# Patient Record
Sex: FEMALE | Race: WHITE | NOT HISPANIC OR LATINO | Employment: UNEMPLOYED | URBAN - METROPOLITAN AREA
[De-identification: names, ages, dates, MRNs, and addresses within clinical notes are randomized per-mention and may not be internally consistent; named-entity substitution may affect disease eponyms.]

---

## 2022-06-08 ENCOUNTER — OFFICE VISIT (OUTPATIENT)
Dept: INTERNAL MEDICINE | Facility: CLINIC | Age: 42
End: 2022-06-08
Payer: MEDICAID

## 2022-06-08 VITALS
SYSTOLIC BLOOD PRESSURE: 112 MMHG | TEMPERATURE: 99 F | WEIGHT: 117.38 LBS | HEIGHT: 58 IN | DIASTOLIC BLOOD PRESSURE: 82 MMHG | HEART RATE: 85 BPM | RESPIRATION RATE: 20 BRPM | BODY MASS INDEX: 24.64 KG/M2

## 2022-06-08 DIAGNOSIS — Z12.31 ENCOUNTER FOR SCREENING MAMMOGRAM FOR MALIGNANT NEOPLASM OF BREAST: ICD-10-CM

## 2022-06-08 DIAGNOSIS — Z13.1 SCREENING FOR DIABETES MELLITUS: ICD-10-CM

## 2022-06-08 DIAGNOSIS — F41.9 ANXIETY AND DEPRESSION: ICD-10-CM

## 2022-06-08 DIAGNOSIS — Z11.3 ROUTINE SCREENING FOR STI (SEXUALLY TRANSMITTED INFECTION): ICD-10-CM

## 2022-06-08 DIAGNOSIS — Z00.00 WELLNESS EXAMINATION: Primary | ICD-10-CM

## 2022-06-08 DIAGNOSIS — F32.A ANXIETY AND DEPRESSION: ICD-10-CM

## 2022-06-08 DIAGNOSIS — G47.00 INSOMNIA, UNSPECIFIED TYPE: ICD-10-CM

## 2022-06-08 PROCEDURE — 3074F SYST BP LT 130 MM HG: CPT | Mod: CPTII,,, | Performed by: NURSE PRACTITIONER

## 2022-06-08 PROCEDURE — 3079F DIAST BP 80-89 MM HG: CPT | Mod: CPTII,,, | Performed by: NURSE PRACTITIONER

## 2022-06-08 PROCEDURE — 99203 OFFICE O/P NEW LOW 30 MIN: CPT | Mod: S$PBB,,, | Performed by: NURSE PRACTITIONER

## 2022-06-08 PROCEDURE — 1159F MED LIST DOCD IN RCRD: CPT | Mod: CPTII,,, | Performed by: NURSE PRACTITIONER

## 2022-06-08 PROCEDURE — 99203 PR OFFICE/OUTPT VISIT, NEW, LEVL III, 30-44 MIN: ICD-10-PCS | Mod: S$PBB,,, | Performed by: NURSE PRACTITIONER

## 2022-06-08 PROCEDURE — 99204 OFFICE O/P NEW MOD 45 MIN: CPT | Mod: PBBFAC | Performed by: NURSE PRACTITIONER

## 2022-06-08 PROCEDURE — 1159F PR MEDICATION LIST DOCUMENTED IN MEDICAL RECORD: ICD-10-PCS | Mod: CPTII,,, | Performed by: NURSE PRACTITIONER

## 2022-06-08 PROCEDURE — 3074F PR MOST RECENT SYSTOLIC BLOOD PRESSURE < 130 MM HG: ICD-10-PCS | Mod: CPTII,,, | Performed by: NURSE PRACTITIONER

## 2022-06-08 PROCEDURE — 1160F PR REVIEW ALL MEDS BY PRESCRIBER/CLIN PHARMACIST DOCUMENTED: ICD-10-PCS | Mod: CPTII,,, | Performed by: NURSE PRACTITIONER

## 2022-06-08 PROCEDURE — 3008F BODY MASS INDEX DOCD: CPT | Mod: CPTII,,, | Performed by: NURSE PRACTITIONER

## 2022-06-08 PROCEDURE — 1160F RVW MEDS BY RX/DR IN RCRD: CPT | Mod: CPTII,,, | Performed by: NURSE PRACTITIONER

## 2022-06-08 PROCEDURE — 3079F PR MOST RECENT DIASTOLIC BLOOD PRESSURE 80-89 MM HG: ICD-10-PCS | Mod: CPTII,,, | Performed by: NURSE PRACTITIONER

## 2022-06-08 PROCEDURE — 3008F PR BODY MASS INDEX (BMI) DOCUMENTED: ICD-10-PCS | Mod: CPTII,,, | Performed by: NURSE PRACTITIONER

## 2022-06-08 RX ORDER — FLUOXETINE HYDROCHLORIDE 20 MG/1
20 CAPSULE ORAL DAILY
COMMUNITY
Start: 2022-05-19 | End: 2023-08-17

## 2022-06-08 RX ORDER — TRAZODONE HYDROCHLORIDE 50 MG/1
50 TABLET ORAL NIGHTLY
COMMUNITY
Start: 2022-03-19

## 2022-06-08 RX ORDER — CLONAZEPAM 0.5 MG/1
0.5 TABLET ORAL 3 TIMES DAILY
COMMUNITY
Start: 2022-04-07 | End: 2023-08-17

## 2022-06-08 RX ORDER — FLUOXETINE HYDROCHLORIDE 40 MG/1
40 CAPSULE ORAL DAILY
COMMUNITY
Start: 2022-03-19

## 2022-06-08 NOTE — ASSESSMENT & PLAN NOTE
Obtain pap records from GYN provider  Labs ordered to be done prior to f/u  Mammo due at 42th birthday in September

## 2022-06-08 NOTE — PROGRESS NOTES
"  PEBBLES Chávez   [facility]  [address] [facility] [address]      PATIENT NAME: Raegan Paez  : 1980  DATE: 22  MRN: [id number]      Billing Provider: PEBBLES Chávez  Level of Service:   Patient PCP Information     Provider PCP Type    PEBBLES Chávez General          Reason for Visit / Chief Complaint: Establish Care       History of Present Illness / Problem Focused Workflow     Raegan Paez presents to the clinic with Establish Care     41 y.o.  presenting [facility].     Previous PCP: Jeffery Ma NP in [place]  PmHx: Anxiety and depression, intentional overdose of acetaminophen with subsequent coma/temporary tracheostomy (resolved)  SHx: see below  FHx: see below. Reports h/o cancer on both sides of the family but cannot ascertain any specific types  Complaints today: Establish care. Patient lives with friends who come to clinic here, so she wanted to switch services to here as well. She has a h/o anxiety, depression and insomnia. She's prescribed Klonopin TID per last PCP for anxiety and "sleep." She was in a coma about 10-11 years ago after her "ex- made me overdose on Norco and Tylenol." She was pregnant with twins at the time and miscarried. Also had a tracheostomy which was removed. [facility] was [place]. [facility] hasn't taken Tylenol since that time. She follows [facility] for women's needs. [facility] last appt a few months ago. Reportedly had a negative pap smear. No acute concerns.        Review of Systems     Review of Systems   Constitutional: Negative.    HENT: Negative.    Eyes: Negative.    Respiratory: Negative.    Cardiovascular: Negative.    Gastrointestinal: Negative.    Endocrine: Negative.    Genitourinary: Negative.    Musculoskeletal: Negative.    Skin: Negative.    Allergic/Immunologic: Negative.    Neurological: " Negative.    Hematological: Negative.    Psychiatric/Behavioral: Negative.        Medical / Social / Family History   History reviewed. No pertinent past medical history.    Past Surgical History:   Procedure Laterality Date    HYSTERECTOMY         Social History  Ms.  reports that she has never smoked. She has never used smokeless tobacco. She reports previous alcohol use. She reports that she does not use drugs.    Family History  Ms.'s family history includes Diabetes in her father.    Medications and Allergies     Medications  Medication List with Changes/Refills   Current Medications    CLONAZEPAM (KLONOPIN) 0.5 MG TABLET    Take 0.5 mg by mouth 3 (three) times daily.    FLUOXETINE 20 MG CAPSULE    Take 20 mg by mouth once daily.    FLUOXETINE 40 MG CAPSULE    Take 40 mg by mouth once daily.    TRAZODONE (DESYREL) 50 MG TABLET    Take 50 mg by mouth nightly.       Allergies  Review of patient's allergies indicates:   Allergen Reactions    Lortab [hydrocodone-acetaminophen]     Tylenol [acetaminophen]        Physical Examination     Vitals:    06/08/22 1353   BP: 112/82   Pulse: 85   Resp: 20   Temp: 98.8 °F (37.1 °C)     Physical Exam  Constitutional:       Appearance: Normal appearance.   HENT:      Head: Normocephalic and atraumatic.      Right Ear: Tympanic membrane, ear canal and external ear normal.      Left Ear: Tympanic membrane, ear canal and external ear normal.      Nose: Nose normal.      Mouth/Throat:      Mouth: Mucous membranes are moist.      Pharynx: Oropharynx is clear.   Eyes:      Extraocular Movements: Extraocular movements intact.      Conjunctiva/sclera: Conjunctivae normal.      Pupils: Pupils are equal, round, and reactive to light.   Neck:      Vascular: No carotid bruit.      Comments: Trach scar  Cardiovascular:      Rate and Rhythm: Normal rate and regular rhythm.      Pulses: Normal pulses.      Heart sounds: Normal heart sounds.   Pulmonary:      Effort: Pulmonary effort is  normal.      Breath sounds: Normal breath sounds.   Abdominal:      General: Bowel sounds are normal.      Palpations: Abdomen is soft.   Musculoskeletal:         General: Normal range of motion.      Cervical back: Normal range of motion and neck supple. No tenderness.   Skin:     General: Skin is warm and dry.   Neurological:      General: No focal deficit present.      Mental Status: She is alert and oriented to person, place, and time. Mental status is at baseline.   Psychiatric:         Mood and Affect: Mood normal.         Behavior: Behavior normal.         Thought Content: Thought content normal.         Judgment: Judgment normal.           Results   No results found for: WBC, RBC, HGB, HCT, MCV, MCH, MCHC, RDW, PLT, MPV, GRAN, LYMPH, MONO, EOS, BASO, EOSINOPHIL, BASOPHIL  CMP  No results found for: NA, K, CL, CO2, GLU, BUN, CREATININE, CALCIUM, PROT, ALBUMIN, BILITOT, ALKPHOS, AST, ALT, ANIONGAP, ESTGFRAFRICA, EGFRNONAA  No results found for: CHOL  No results found for: HDL  No results found for: LDLCALC  No results found for: TRIG  No results found for: CHOLHDL  No results found for: TSH  No results found for: PHUR, SPECGRAV, PROTEINUA, GLUCUA, KETONESU, OCCULTUA, NITRITE, LEUKOCYTESUR        Assessment and Plan (including Health Maintenance)     Plan:         Health Maintenance Due   Topic Date Due    Hepatitis C Screening  Never done    Lipid Panel  Never done    COVID-19 Vaccine (1) Never done    HIV Screening  Never done    TETANUS VACCINE  Never done       Problem List Items Addressed This Visit        Psychiatric    Anxiety and depression    Overview     Pt's PCP was prescribing Klonopin 0.5 mg po TID for anxiety. Pt informed that this controlled substance can't be continued for chronic use in this clinic. Advised pt that she'd need to be referred to a mental health provider. Pt stated that is a clinic in [place] that offers such services and she would like to be seen there d/t convenience. She  will contact the clinic and notify us if a referral is needed              Other    Wellness examination - Primary    Current Assessment & Plan     Obtain pap records from GYN provider  Labs ordered to be done prior to f/u  Mammo due at 42th birthday in September           Relevant Orders    CBC Auto Differential    Comprehensive Metabolic Panel    Lipid Panel    TSH    Urinalysis, Reflex to Urine Culture Urine, Clean Catch    Insomnia    Overview     Managed with Trazodone 50 mg po nightly             Other Visit Diagnoses     Routine screening for STI (sexually transmitted infection)        Relevant Orders    Hepatitis Panel, Acute    HIV 1/2 Ag/Ab (4th Gen)    Chlamydia/GC, PCR    SYPHILIS ANTIBODY (WITH REFLEX RPR)    Encounter for screening mammogram for malignant neoplasm of breast        Relevant Orders    Mammo Digital Screening Bilat    Screening for diabetes mellitus        Relevant Orders    Hemoglobin A1C          Health Maintenance Topics with due status: Not Due       Topic Last Completion Date    Influenza Vaccine Not Due       Future Appointments   Date Time Provider Department Center   7/7/2022 10:00 AM PEBBLES Chávez [facility]            Signature:  PEBBLES Chávez  [facility]  [address] [facility] [address]    Date of encounter: 6/8/22

## 2022-07-05 ENCOUNTER — PATIENT MESSAGE (OUTPATIENT)
Dept: INTERNAL MEDICINE | Facility: CLINIC | Age: 42
End: 2022-07-05
Payer: MEDICAID

## 2022-07-05 ENCOUNTER — LAB VISIT (OUTPATIENT)
Dept: LAB | Facility: HOSPITAL | Age: 42
End: 2022-07-05
Attending: NURSE PRACTITIONER
Payer: MEDICAID

## 2022-07-05 DIAGNOSIS — Z00.00 WELLNESS EXAMINATION: ICD-10-CM

## 2022-07-05 DIAGNOSIS — Z11.3 ROUTINE SCREENING FOR STI (SEXUALLY TRANSMITTED INFECTION): ICD-10-CM

## 2022-07-05 DIAGNOSIS — Z13.1 SCREENING FOR DIABETES MELLITUS: ICD-10-CM

## 2022-07-05 LAB
ALBUMIN SERPL-MCNC: 3.7 GM/DL (ref 3.5–5)
ALBUMIN/GLOB SERPL: 1 RATIO (ref 1.1–2)
ALP SERPL-CCNC: 99 UNIT/L (ref 40–150)
ALT SERPL-CCNC: 14 UNIT/L (ref 0–55)
APPEARANCE UR: CLEAR
AST SERPL-CCNC: 13 UNIT/L (ref 5–34)
BACTERIA #/AREA URNS AUTO: ABNORMAL /HPF
BASOPHILS # BLD AUTO: 0.04 X10(3)/MCL (ref 0–0.2)
BASOPHILS NFR BLD AUTO: 0.7 %
BILIRUB UR QL STRIP.AUTO: NEGATIVE MG/DL
BILIRUBIN DIRECT+TOT PNL SERPL-MCNC: 0.6 MG/DL
BUN SERPL-MCNC: 16.6 MG/DL (ref 7–18.7)
C TRACH DNA SPEC QL NAA+PROBE: NOT DETECTED
CALCIUM SERPL-MCNC: 8.9 MG/DL (ref 8.4–10.2)
CHLORIDE SERPL-SCNC: 107 MMOL/L (ref 98–107)
CHOLEST SERPL-MCNC: 189 MG/DL
CHOLEST/HDLC SERPL: 4 {RATIO} (ref 0–5)
CO2 SERPL-SCNC: 24 MMOL/L (ref 22–29)
COLOR UR AUTO: ABNORMAL
CREAT SERPL-MCNC: 0.74 MG/DL (ref 0.55–1.02)
EOSINOPHIL # BLD AUTO: 0.22 X10(3)/MCL (ref 0–0.9)
EOSINOPHIL NFR BLD AUTO: 3.8 %
ERYTHROCYTE [DISTWIDTH] IN BLOOD BY AUTOMATED COUNT: 13.3 % (ref 11.5–17)
EST. AVERAGE GLUCOSE BLD GHB EST-MCNC: 93.9 MG/DL
GLOBULIN SER-MCNC: 3.7 GM/DL (ref 2.4–3.5)
GLUCOSE SERPL-MCNC: 103 MG/DL (ref 74–100)
GLUCOSE UR QL STRIP.AUTO: NORMAL MG/DL
HAV IGM SERPL QL IA: NONREACTIVE
HBA1C MFR BLD: 4.9 %
HBV CORE IGM SERPL QL IA: NONREACTIVE
HBV SURFACE AG SERPL QL IA: REACTIVE
HBV SURFACE AG SERPLBLD QL IA.RAPID: NORMAL
HCT VFR BLD AUTO: 43 % (ref 37–47)
HCV AB SERPL QL IA: NONREACTIVE
HDLC SERPL-MCNC: 52 MG/DL (ref 35–60)
HGB BLD-MCNC: 13.6 GM/DL (ref 12–16)
HIV 1+2 AB+HIV1 P24 AG SERPL QL IA: NONREACTIVE
HYALINE CASTS #/AREA URNS LPF: ABNORMAL /LPF
IMM GRANULOCYTES # BLD AUTO: 0.02 X10(3)/MCL (ref 0–0.04)
IMM GRANULOCYTES NFR BLD AUTO: 0.3 %
KETONES UR QL STRIP.AUTO: NEGATIVE MG/DL
LDLC SERPL CALC-MCNC: 113 MG/DL (ref 50–140)
LEUKOCYTE ESTERASE UR QL STRIP.AUTO: 25 UNIT/L
LYMPHOCYTES # BLD AUTO: 0.94 X10(3)/MCL (ref 0.6–4.6)
LYMPHOCYTES NFR BLD AUTO: 16.3 %
MCH RBC QN AUTO: 30 PG (ref 27–31)
MCHC RBC AUTO-ENTMCNC: 31.6 MG/DL (ref 33–36)
MCV RBC AUTO: 94.9 FL (ref 80–94)
MONOCYTES # BLD AUTO: 0.41 X10(3)/MCL (ref 0.1–1.3)
MONOCYTES NFR BLD AUTO: 7.1 %
MUCOUS THREADS URNS QL MICRO: ABNORMAL /LPF
N GONORRHOEA DNA SPEC QL NAA+PROBE: NOT DETECTED
NEUTROPHILS # BLD AUTO: 4.1 X10(3)/MCL (ref 2.1–9.2)
NEUTROPHILS NFR BLD AUTO: 71.8 %
NITRITE UR QL STRIP.AUTO: NEGATIVE
NRBC BLD AUTO-RTO: 0 %
PH UR STRIP.AUTO: 6 [PH]
PLATELET # BLD AUTO: 222 X10(3)/MCL (ref 130–400)
PMV BLD AUTO: 10.5 FL (ref 7.4–10.4)
POTASSIUM SERPL-SCNC: 3.9 MMOL/L (ref 3.5–5.1)
PROT SERPL-MCNC: 7.4 GM/DL (ref 6.4–8.3)
PROT UR QL STRIP.AUTO: NEGATIVE MG/DL
RBC # BLD AUTO: 4.53 X10(6)/MCL (ref 4.2–5.4)
RBC #/AREA URNS AUTO: ABNORMAL /HPF
RBC UR QL AUTO: NEGATIVE UNIT/L
SODIUM SERPL-SCNC: 138 MMOL/L (ref 136–145)
SP GR UR STRIP.AUTO: 1.02
SQUAMOUS #/AREA URNS LPF: ABNORMAL /HPF
T PALLIDUM AB SER QL: NONREACTIVE
TRIGL SERPL-MCNC: 120 MG/DL (ref 37–140)
TSH SERPL-ACNC: 3.41 UIU/ML (ref 0.35–4.94)
UROBILINOGEN UR STRIP-ACNC: NORMAL MG/DL
VLDLC SERPL CALC-MCNC: 24 MG/DL
WBC # SPEC AUTO: 5.8 X10(3)/MCL (ref 4.5–11.5)
WBC #/AREA URNS AUTO: ABNORMAL /HPF

## 2022-07-05 PROCEDURE — 87389 HIV-1 AG W/HIV-1&-2 AB AG IA: CPT

## 2022-07-05 PROCEDURE — 87088 URINE BACTERIA CULTURE: CPT

## 2022-07-05 PROCEDURE — 81001 URINALYSIS AUTO W/SCOPE: CPT

## 2022-07-05 PROCEDURE — 80074 ACUTE HEPATITIS PANEL: CPT

## 2022-07-05 PROCEDURE — 36415 COLL VENOUS BLD VENIPUNCTURE: CPT

## 2022-07-05 PROCEDURE — 87341 HEP B SURFACE AG NEUTRLZJ IA: CPT

## 2022-07-05 PROCEDURE — 85025 COMPLETE CBC W/AUTO DIFF WBC: CPT

## 2022-07-05 PROCEDURE — 84443 ASSAY THYROID STIM HORMONE: CPT

## 2022-07-05 PROCEDURE — 86780 TREPONEMA PALLIDUM: CPT

## 2022-07-05 PROCEDURE — 80053 COMPREHEN METABOLIC PANEL: CPT

## 2022-07-05 PROCEDURE — 80061 LIPID PANEL: CPT

## 2022-07-05 PROCEDURE — 83036 HEMOGLOBIN GLYCOSYLATED A1C: CPT

## 2022-07-05 PROCEDURE — 87591 N.GONORRHOEAE DNA AMP PROB: CPT

## 2022-07-05 PROCEDURE — 87491 CHLMYD TRACH DNA AMP PROBE: CPT

## 2022-07-06 ENCOUNTER — PATIENT MESSAGE (OUTPATIENT)
Dept: INTERNAL MEDICINE | Facility: CLINIC | Age: 42
End: 2022-07-06
Payer: MEDICAID

## 2022-07-06 PROBLEM — B19.10 HEPATITIS B: Status: ACTIVE | Noted: 2022-07-06

## 2022-07-06 LAB — PATH REV: NORMAL

## 2022-07-06 NOTE — PROGRESS NOTES
"  PEBBLES Chávez   [facility]  [address] [facility] [address]      PATIENT NAME: Raegan Paez  : 1980  DATE: 22  MRN: [id number]      Billing Provider: PEBBLES Chávez  Level of Service:   Patient PCP Information     Provider PCP Type    PEBBLES Chávez General          Reason for Visit / Chief Complaint: Follow-up (Lab review)       History of Present Illness / Problem Focused Workflow     Raegan Paez presents to the clinic with Follow-up (Lab review)     2022: 41 y.o.  presenting to [facility] to establish primary care.     Previous PCP: Jeffery Ma NP in [place]  PmHx: Anxiety and depression, intentional overdose of acetaminophen with subsequent coma/temporary tracheostomy (resolved)  SHx: see below  FHx: see below. Reports h/o cancer on both sides of the family but cannot ascertain any specific types  Complaints today: Establish care. Patient lives with friends who come to clinic here, so she wanted to switch services to here as well. She has a h/o anxiety, depression and insomnia. She's prescribed Klonopin TID per last PCP for anxiety and "sleep." She was in a coma about 10-11 years ago after her "ex- made me overdose on Norco and Tylenol." She was pregnant with twins at the time and miscarried. Also had a tracheostomy which was removed.  [place]. [facility] hasn't taken Tylenol since that time. She follows [facility] for women's needs. [facility] last appt a few months ago. Reportedly had a negative pap smear. No acute concerns.    2022: Raegan is presenting to review labs. Lab review revealed: CBC unremarkable, glucose 103, A1c 4.9, FLP WNL, TSH WNL, UA with some WBCs and occ bacteria (urine culture no growth; patient denies urinary concerns), Hepatitis B surface antigen reactive. "The presence of hepatitis B surface antigen indicates acute or " "chronic hepatitis B infection." Today, patient reports that she's always been told "I was a carrier." States father is a carrier and sister has hepatitis B. She has no abdominal pain, fatigue, appetite changes, abd swelling, jaundice, dark urine, etc. No other concerns.    Note, patient following psychiatry outside of this facility.       Review of Systems     Review of Systems   Constitutional: Negative.    HENT: Negative.    Eyes: Negative.    Respiratory: Negative.    Cardiovascular: Negative.    Gastrointestinal: Negative.    Endocrine: Negative.    Genitourinary: Negative.    Musculoskeletal: Negative.    Skin: Negative.    Allergic/Immunologic: Negative.    Neurological: Negative.    Hematological: Negative.    Psychiatric/Behavioral: Negative.        Medical / Social / Family History   History reviewed. No pertinent past medical history.    Past Surgical History:   Procedure Laterality Date    HYSTERECTOMY         Social History  Ms.  reports that she has never smoked. She has never used smokeless tobacco. She reports previous alcohol use. She reports that she does not use drugs.    Family History  Ms.'s family history includes Diabetes in her father.    Medications and Allergies     Medications  Medication List with Changes/Refills   Current Medications    CLONAZEPAM (KLONOPIN) 0.5 MG TABLET    Take 0.5 mg by mouth 3 (three) times daily.    FLUOXETINE 20 MG CAPSULE    Take 20 mg by mouth once daily.    FLUOXETINE 40 MG CAPSULE    Take 40 mg by mouth once daily.    TRAZODONE (DESYREL) 50 MG TABLET    Take 50 mg by mouth nightly.       Allergies  Review of patient's allergies indicates:   Allergen Reactions    Lortab [hydrocodone-acetaminophen]      Unsure    Tylenol [acetaminophen]      Unsure       Physical Examination     Vitals:    07/07/22 1107   BP: 102/70   Pulse: 81   Resp: 20   Temp: 98.3 °F (36.8 °C)     Physical Exam  Constitutional:       Appearance: Normal appearance.   HENT:      Head: " Normocephalic and atraumatic.      Mouth/Throat:      Mouth: Mucous membranes are moist.   Eyes:      Extraocular Movements: Extraocular movements intact.   Neck:      Comments: Trach scar  Cardiovascular:      Rate and Rhythm: Normal rate and regular rhythm.   Pulmonary:      Effort: Pulmonary effort is normal.      Breath sounds: Normal breath sounds.   Abdominal:      Palpations: Abdomen is soft.   Musculoskeletal:         General: Normal range of motion.      Cervical back: Normal range of motion and neck supple.   Skin:     General: Skin is warm and dry.   Neurological:      General: No focal deficit present.      Mental Status: She is alert and oriented to person, place, and time. Mental status is at baseline.   Psychiatric:         Mood and Affect: Mood normal.         Behavior: Behavior normal.         Thought Content: Thought content normal.         Judgment: Judgment normal.           Results     Lab Results   Component Value Date    WBC 5.8 07/05/2022    RBC 4.53 07/05/2022    HGB 13.6 07/05/2022    HCT 43.0 07/05/2022    MCV 94.9 (H) 07/05/2022    MCH 30.0 07/05/2022    MCHC 31.6 (L) 07/05/2022    RDW 13.3 07/05/2022     07/05/2022    MPV 10.5 (H) 07/05/2022     CMP  Sodium Level   Date Value Ref Range Status   07/05/2022 138 136 - 145 mmol/L Final     Potassium Level   Date Value Ref Range Status   07/05/2022 3.9 3.5 - 5.1 mmol/L Final     Carbon Dioxide   Date Value Ref Range Status   07/05/2022 24 22 - 29 mmol/L Final     Blood Urea Nitrogen   Date Value Ref Range Status   07/05/2022 16.6 7.0 - 18.7 mg/dL Final     Creatinine   Date Value Ref Range Status   07/05/2022 0.74 0.55 - 1.02 mg/dL Final     Calcium Level Total   Date Value Ref Range Status   07/05/2022 8.9 8.4 - 10.2 mg/dL Final     Albumin Level   Date Value Ref Range Status   07/05/2022 3.7 3.5 - 5.0 gm/dL Final     Bilirubin Total   Date Value Ref Range Status   07/05/2022 0.6 <=1.5 mg/dL Final     Alkaline Phosphatase   Date Value  Ref Range Status   07/05/2022 99 40 - 150 unit/L Final     Aspartate Aminotransferase   Date Value Ref Range Status   07/05/2022 13 5 - 34 unit/L Final     Alanine Aminotransferase   Date Value Ref Range Status   07/05/2022 14 0 - 55 unit/L Final     Estimated GFR-Non    Date Value Ref Range Status   07/05/2022 >60 mls/min/1.73/m2 Final     Lab Results   Component Value Date    CHOL 189 07/05/2022     Lab Results   Component Value Date    HDL 52 07/05/2022     No results found for: LDLCALC  Lab Results   Component Value Date    TRIG 120 07/05/2022     No results found for: CHOLHDL  Lab Results   Component Value Date    TSH 3.4065 07/05/2022     Lab Results   Component Value Date    PROTEINUA Negative 07/05/2022    LEUKOCYTESUR 25  (A) 07/05/2022           Assessment and Plan (including Health Maintenance)     Plan:         Health Maintenance Due   Topic Date Due    COVID-19 Vaccine (1) Never done    Pneumococcal Vaccines (Age 0-64) (1 - PCV) Never done    TETANUS VACCINE  Never done       Problem List Items Addressed This Visit        Psychiatric    Anxiety and depression    Overview     Pt's PCP was prescribing Klonopin 0.5 mg po TID for anxiety. Pt informed that this controlled substance can't be continued for chronic use in this clinic. Advised pt that she'd need to be referred to a mental health provider. Pt stated that is a clinic in [place] that offers such services and she would like to be seen there d/t convenience. She will contact the clinic and notify us if a referral is needed           Current Assessment & Plan     Keep appt with outside mental health provider              GI    Hepatitis B    Current Assessment & Plan     Obtain viral load and abd US. Re-group with results  See handout           Relevant Orders    Hepatitis B DNA, Quant    US Abdomen Limited_Liver          Health Maintenance Topics with due status: Not Due       Topic Last Completion Date    Influenza Vaccine Not  Due       Future Appointments   Date Time Provider Department Center   1/9/2023 10:00 AM PEBBLES Chávez [facility]            Signature:  PEBBLES Chávez  [facility]  [address] [address]    Date of encounter: 7/7/22

## 2022-07-07 ENCOUNTER — OFFICE VISIT (OUTPATIENT)
Dept: INTERNAL MEDICINE | Facility: CLINIC | Age: 42
End: 2022-07-07
Payer: MEDICAID

## 2022-07-07 VITALS
DIASTOLIC BLOOD PRESSURE: 70 MMHG | HEART RATE: 81 BPM | TEMPERATURE: 98 F | WEIGHT: 120.19 LBS | HEIGHT: 58 IN | BODY MASS INDEX: 25.23 KG/M2 | RESPIRATION RATE: 20 BRPM | SYSTOLIC BLOOD PRESSURE: 102 MMHG

## 2022-07-07 DIAGNOSIS — B19.10 HEPATITIS B INFECTION WITHOUT DELTA AGENT WITHOUT HEPATIC COMA, UNSPECIFIED CHRONICITY: ICD-10-CM

## 2022-07-07 DIAGNOSIS — F41.9 ANXIETY AND DEPRESSION: ICD-10-CM

## 2022-07-07 DIAGNOSIS — F32.A ANXIETY AND DEPRESSION: ICD-10-CM

## 2022-07-07 LAB — BACTERIA UR CULT: NORMAL

## 2022-07-07 PROCEDURE — 99213 OFFICE O/P EST LOW 20 MIN: CPT | Mod: S$PBB,,, | Performed by: NURSE PRACTITIONER

## 2022-07-07 PROCEDURE — 1159F PR MEDICATION LIST DOCUMENTED IN MEDICAL RECORD: ICD-10-PCS | Mod: CPTII,,, | Performed by: NURSE PRACTITIONER

## 2022-07-07 PROCEDURE — 3074F PR MOST RECENT SYSTOLIC BLOOD PRESSURE < 130 MM HG: ICD-10-PCS | Mod: CPTII,,, | Performed by: NURSE PRACTITIONER

## 2022-07-07 PROCEDURE — 3074F SYST BP LT 130 MM HG: CPT | Mod: CPTII,,, | Performed by: NURSE PRACTITIONER

## 2022-07-07 PROCEDURE — 3008F PR BODY MASS INDEX (BMI) DOCUMENTED: ICD-10-PCS | Mod: CPTII,,, | Performed by: NURSE PRACTITIONER

## 2022-07-07 PROCEDURE — 1160F PR REVIEW ALL MEDS BY PRESCRIBER/CLIN PHARMACIST DOCUMENTED: ICD-10-PCS | Mod: CPTII,,, | Performed by: NURSE PRACTITIONER

## 2022-07-07 PROCEDURE — 99214 OFFICE O/P EST MOD 30 MIN: CPT | Mod: PBBFAC | Performed by: NURSE PRACTITIONER

## 2022-07-07 PROCEDURE — 3008F BODY MASS INDEX DOCD: CPT | Mod: CPTII,,, | Performed by: NURSE PRACTITIONER

## 2022-07-07 PROCEDURE — 1160F RVW MEDS BY RX/DR IN RCRD: CPT | Mod: CPTII,,, | Performed by: NURSE PRACTITIONER

## 2022-07-07 PROCEDURE — 1159F MED LIST DOCD IN RCRD: CPT | Mod: CPTII,,, | Performed by: NURSE PRACTITIONER

## 2022-07-07 PROCEDURE — 3078F PR MOST RECENT DIASTOLIC BLOOD PRESSURE < 80 MM HG: ICD-10-PCS | Mod: CPTII,,, | Performed by: NURSE PRACTITIONER

## 2022-07-07 PROCEDURE — 99213 PR OFFICE/OUTPT VISIT, EST, LEVL III, 20-29 MIN: ICD-10-PCS | Mod: S$PBB,,, | Performed by: NURSE PRACTITIONER

## 2022-07-07 PROCEDURE — 3078F DIAST BP <80 MM HG: CPT | Mod: CPTII,,, | Performed by: NURSE PRACTITIONER

## 2022-07-18 ENCOUNTER — PATIENT MESSAGE (OUTPATIENT)
Dept: INTERNAL MEDICINE | Facility: CLINIC | Age: 42
End: 2022-07-18
Payer: MEDICAID

## 2022-07-19 ENCOUNTER — PATIENT MESSAGE (OUTPATIENT)
Dept: INTERNAL MEDICINE | Facility: CLINIC | Age: 42
End: 2022-07-19
Payer: MEDICAID

## 2022-07-19 ENCOUNTER — HOSPITAL ENCOUNTER (OUTPATIENT)
Dept: RADIOLOGY | Facility: HOSPITAL | Age: 42
Discharge: HOME | End: 2022-07-19
Attending: NURSE PRACTITIONER
Payer: MEDICAID

## 2022-07-19 DIAGNOSIS — B19.10 HEPATITIS B INFECTION WITHOUT DELTA AGENT WITHOUT HEPATIC COMA, UNSPECIFIED CHRONICITY: ICD-10-CM

## 2022-07-19 PROCEDURE — 76705 ECHO EXAM OF ABDOMEN: CPT | Mod: TC

## 2022-07-19 NOTE — TELEPHONE ENCOUNTER
Please inform patient of abd/liver US:    1. No significant sonographic abnormality of the liver.  2. Gallbladder is not seen, question cholecystectomy.    Will await labs to further eval hep B. Please remind pt to complete.

## 2022-07-25 ENCOUNTER — LAB VISIT (OUTPATIENT)
Dept: LAB | Facility: HOSPITAL | Age: 42
End: 2022-07-25
Attending: NURSE PRACTITIONER
Payer: MEDICAID

## 2022-07-25 DIAGNOSIS — B19.10 HEPATITIS B INFECTION WITHOUT DELTA AGENT WITHOUT HEPATIC COMA, UNSPECIFIED CHRONICITY: ICD-10-CM

## 2022-07-25 PROCEDURE — 36415 COLL VENOUS BLD VENIPUNCTURE: CPT

## 2022-07-25 PROCEDURE — 87517 HEPATITIS B DNA QUANT: CPT

## 2022-07-27 ENCOUNTER — PATIENT MESSAGE (OUTPATIENT)
Dept: INTERNAL MEDICINE | Facility: CLINIC | Age: 42
End: 2022-07-27
Payer: MEDICAID

## 2022-07-27 LAB — HBV DNA SERPL NAA+PROBE-ACNC: 441 IU/ML

## 2022-07-28 ENCOUNTER — PATIENT MESSAGE (OUTPATIENT)
Dept: ADMINISTRATIVE | Facility: HOSPITAL | Age: 42
End: 2022-07-28
Payer: MEDICAID

## 2022-07-28 DIAGNOSIS — B19.10 HEP B W/O COMA: Primary | ICD-10-CM

## 2022-07-28 NOTE — TELEPHONE ENCOUNTER
"Please inform patient that she does have an active hep B virus in the blood, so I am referring to [facility] for mgmt. She will receive a call from their clinic. If she has any additional questions, she can schedule an appt.    Patient education:  "Discuss the infection with any sexual partners and use a latex condom with every sexual encounter (unless the sexual partner(s) is known to be immune to hepatitis B).  Do not share razors, toothbrushes, or anything that might have blood on it.  Cover open sores and cuts with a bandage.  Do not donate blood, body organs, other tissues, or sperm.  Immediate family, household members, and regular sexual partners should be tested for hepatitis B. Anyone who is at risk of hepatitis B infection should be vaccinated.   Do not share any injection drug equipment (needles, syringes).  Clean blood spills with a mixture of 1 part household bleach to 9 parts water.    Hepatitis B cannot be spread by:  Hugging or kissing  Sharing eating utensils or cups  Sneezing or coughing  Breastfeeding"  "

## 2022-08-03 DIAGNOSIS — B19.10 HEPATITIS B INFECTION WITHOUT DELTA AGENT WITHOUT HEPATIC COMA, UNSPECIFIED CHRONICITY: Primary | ICD-10-CM

## 2022-08-29 ENCOUNTER — PATIENT MESSAGE (OUTPATIENT)
Dept: ADMINISTRATIVE | Facility: HOSPITAL | Age: 42
End: 2022-08-29
Payer: MEDICAID

## 2022-08-29 ENCOUNTER — PATIENT MESSAGE (OUTPATIENT)
Dept: INTERNAL MEDICINE | Facility: CLINIC | Age: 42
End: 2022-08-29
Payer: MEDICAID

## 2022-09-08 ENCOUNTER — HOSPITAL ENCOUNTER (OUTPATIENT)
Dept: RADIOLOGY | Facility: HOSPITAL | Age: 42
Discharge: HOME | End: 2022-09-08
Attending: NURSE PRACTITIONER
Payer: MEDICAID

## 2022-09-08 DIAGNOSIS — Z12.31 ENCOUNTER FOR SCREENING MAMMOGRAM FOR MALIGNANT NEOPLASM OF BREAST: ICD-10-CM

## 2022-09-08 PROCEDURE — 77067 SCR MAMMO BI INCL CAD: CPT | Mod: 26,,, | Performed by: RADIOLOGY

## 2022-09-08 PROCEDURE — 77063 BREAST TOMOSYNTHESIS BI: CPT | Mod: 26,,, | Performed by: RADIOLOGY

## 2022-09-08 PROCEDURE — 77067 SCR MAMMO BI INCL CAD: CPT | Mod: TC

## 2022-09-08 PROCEDURE — 77067 MAMMO DIGITAL SCREENING BILAT WITH TOMO: ICD-10-PCS | Mod: 26,,, | Performed by: RADIOLOGY

## 2022-09-08 PROCEDURE — 77063 MAMMO DIGITAL SCREENING BILAT WITH TOMO: ICD-10-PCS | Mod: 26,,, | Performed by: RADIOLOGY

## 2022-09-09 ENCOUNTER — TELEPHONE (OUTPATIENT)
Dept: ADMINISTRATIVE | Facility: HOSPITAL | Age: 42
End: 2022-09-09
Payer: MEDICAID

## 2022-09-09 ENCOUNTER — PATIENT MESSAGE (OUTPATIENT)
Dept: ADMINISTRATIVE | Facility: HOSPITAL | Age: 42
End: 2022-09-09
Payer: MEDICAID

## 2022-09-12 PROBLEM — Z00.00 WELLNESS EXAMINATION: Status: RESOLVED | Noted: 2022-06-08 | Resolved: 2022-09-12

## 2022-09-16 ENCOUNTER — OFFICE VISIT (OUTPATIENT)
Dept: INFECTIOUS DISEASES | Facility: CLINIC | Age: 42
End: 2022-09-16
Payer: MEDICAID

## 2022-09-16 ENCOUNTER — PATIENT MESSAGE (OUTPATIENT)
Dept: INFECTIOUS DISEASES | Facility: CLINIC | Age: 42
End: 2022-09-16

## 2022-09-16 ENCOUNTER — PROCEDURE VISIT (OUTPATIENT)
Dept: INFECTIOUS DISEASES | Facility: CLINIC | Age: 42
End: 2022-09-16
Payer: MEDICAID

## 2022-09-16 VITALS
TEMPERATURE: 98 F | HEART RATE: 83 BPM | BODY MASS INDEX: 23.99 KG/M2 | RESPIRATION RATE: 14 BRPM | DIASTOLIC BLOOD PRESSURE: 77 MMHG | HEIGHT: 58 IN | SYSTOLIC BLOOD PRESSURE: 110 MMHG | WEIGHT: 114.31 LBS

## 2022-09-16 DIAGNOSIS — B19.10 HEP B W/O COMA: ICD-10-CM

## 2022-09-16 DIAGNOSIS — B19.10 HEPATITIS B INFECTION WITHOUT DELTA AGENT WITHOUT HEPATIC COMA, UNSPECIFIED CHRONICITY: ICD-10-CM

## 2022-09-16 DIAGNOSIS — B18.1 CHRONIC HEPATITIS B: Primary | ICD-10-CM

## 2022-09-16 LAB
ALBUMIN SERPL-MCNC: 4.5 GM/DL (ref 3.5–5)
ALBUMIN/GLOB SERPL: 1.2 RATIO (ref 1.1–2)
ALP SERPL-CCNC: 95 UNIT/L (ref 40–150)
ALT SERPL-CCNC: 17 UNIT/L (ref 0–55)
AST SERPL-CCNC: 13 UNIT/L (ref 5–34)
BASOPHILS # BLD AUTO: 0.03 X10(3)/MCL (ref 0–0.2)
BASOPHILS NFR BLD AUTO: 0.4 %
BILIRUBIN DIRECT+TOT PNL SERPL-MCNC: 0.7 MG/DL
BUN SERPL-MCNC: 11.6 MG/DL (ref 7–18.7)
CALCIUM SERPL-MCNC: 10 MG/DL (ref 8.4–10.2)
CHLORIDE SERPL-SCNC: 103 MMOL/L (ref 98–107)
CO2 SERPL-SCNC: 26 MMOL/L (ref 22–29)
CREAT SERPL-MCNC: 0.75 MG/DL (ref 0.55–1.02)
EOSINOPHIL # BLD AUTO: 0.12 X10(3)/MCL (ref 0–0.9)
EOSINOPHIL NFR BLD AUTO: 1.5 %
ERYTHROCYTE [DISTWIDTH] IN BLOOD BY AUTOMATED COUNT: 13 % (ref 11.5–17)
GFR SERPLBLD CREATININE-BSD FMLA CKD-EPI: >60 MLS/MIN/1.73/M2
GLOBULIN SER-MCNC: 3.7 GM/DL (ref 2.4–3.5)
GLUCOSE SERPL-MCNC: 96 MG/DL (ref 74–100)
HAV AB SER QL IA: NONREACTIVE
HBV CORE AB SERPL QL IA: REACTIVE
HBV SURFACE AB SER-ACNC: 0.16 MIU/ML
HBV SURFACE AB SERPL IA-ACNC: NONREACTIVE M[IU]/ML
HBV SURFACE AG SERPL QL IA: REACTIVE
HBV SURFACE AG SERPLBLD QL IA.RAPID: NORMAL
HCT VFR BLD AUTO: 47.3 % (ref 37–47)
HGB BLD-MCNC: 15.2 GM/DL (ref 12–16)
IMM GRANULOCYTES # BLD AUTO: 0.04 X10(3)/MCL (ref 0–0.04)
IMM GRANULOCYTES NFR BLD AUTO: 0.5 %
LYMPHOCYTES # BLD AUTO: 1.12 X10(3)/MCL (ref 0.6–4.6)
LYMPHOCYTES NFR BLD AUTO: 14.1 %
MCH RBC QN AUTO: 30.6 PG (ref 27–31)
MCHC RBC AUTO-ENTMCNC: 32.1 MG/DL (ref 33–36)
MCV RBC AUTO: 95.2 FL (ref 80–94)
MONOCYTES # BLD AUTO: 0.32 X10(3)/MCL (ref 0.1–1.3)
MONOCYTES NFR BLD AUTO: 4 %
NEUTROPHILS # BLD AUTO: 6.3 X10(3)/MCL (ref 2.1–9.2)
NEUTROPHILS NFR BLD AUTO: 79.5 %
NRBC BLD AUTO-RTO: 0 %
PLATELET # BLD AUTO: 271 X10(3)/MCL (ref 130–400)
PMV BLD AUTO: 10.9 FL (ref 7.4–10.4)
POTASSIUM SERPL-SCNC: 4.8 MMOL/L (ref 3.5–5.1)
PROT SERPL-MCNC: 8.2 GM/DL (ref 6.4–8.3)
RBC # BLD AUTO: 4.97 X10(6)/MCL (ref 4.2–5.4)
SODIUM SERPL-SCNC: 141 MMOL/L (ref 136–145)
WBC # SPEC AUTO: 7.9 X10(3)/MCL (ref 4.5–11.5)

## 2022-09-16 PROCEDURE — 80053 COMPREHEN METABOLIC PANEL: CPT | Performed by: NURSE PRACTITIONER

## 2022-09-16 PROCEDURE — 1159F MED LIST DOCD IN RCRD: CPT | Mod: CPTII,,, | Performed by: NURSE PRACTITIONER

## 2022-09-16 PROCEDURE — 86706 HEP B SURFACE ANTIBODY: CPT | Performed by: NURSE PRACTITIONER

## 2022-09-16 PROCEDURE — 3008F PR BODY MASS INDEX (BMI) DOCUMENTED: ICD-10-PCS | Mod: CPTII,,, | Performed by: NURSE PRACTITIONER

## 2022-09-16 PROCEDURE — 1159F PR MEDICATION LIST DOCUMENTED IN MEDICAL RECORD: ICD-10-PCS | Mod: CPTII,,, | Performed by: NURSE PRACTITIONER

## 2022-09-16 PROCEDURE — 99215 PR OFFICE/OUTPT VISIT, EST, LEVL V, 40-54 MIN: ICD-10-PCS | Mod: S$PBB,,, | Performed by: NURSE PRACTITIONER

## 2022-09-16 PROCEDURE — 87341 HEP B SURFACE AG NEUTRLZJ IA: CPT | Performed by: NURSE PRACTITIONER

## 2022-09-16 PROCEDURE — 1160F RVW MEDS BY RX/DR IN RCRD: CPT | Mod: CPTII,,, | Performed by: NURSE PRACTITIONER

## 2022-09-16 PROCEDURE — 87350 HEPATITIS BE AG IA: CPT | Performed by: NURSE PRACTITIONER

## 2022-09-16 PROCEDURE — 3074F PR MOST RECENT SYSTOLIC BLOOD PRESSURE < 130 MM HG: ICD-10-PCS | Mod: CPTII,,, | Performed by: NURSE PRACTITIONER

## 2022-09-16 PROCEDURE — 1160F PR REVIEW ALL MEDS BY PRESCRIBER/CLIN PHARMACIST DOCUMENTED: ICD-10-PCS | Mod: CPTII,,, | Performed by: NURSE PRACTITIONER

## 2022-09-16 PROCEDURE — 86704 HEP B CORE ANTIBODY TOTAL: CPT | Performed by: NURSE PRACTITIONER

## 2022-09-16 PROCEDURE — 85025 COMPLETE CBC W/AUTO DIFF WBC: CPT | Performed by: NURSE PRACTITIONER

## 2022-09-16 PROCEDURE — 91200 LIVER ELASTOGRAPHY: CPT | Mod: PBBFAC | Performed by: INTERNAL MEDICINE

## 2022-09-16 PROCEDURE — 36415 COLL VENOUS BLD VENIPUNCTURE: CPT | Performed by: NURSE PRACTITIONER

## 2022-09-16 PROCEDURE — 3078F DIAST BP <80 MM HG: CPT | Mod: CPTII,,, | Performed by: NURSE PRACTITIONER

## 2022-09-16 PROCEDURE — 99215 OFFICE O/P EST HI 40 MIN: CPT | Mod: S$PBB,,, | Performed by: NURSE PRACTITIONER

## 2022-09-16 PROCEDURE — 87340 HEPATITIS B SURFACE AG IA: CPT | Performed by: NURSE PRACTITIONER

## 2022-09-16 PROCEDURE — 3074F SYST BP LT 130 MM HG: CPT | Mod: CPTII,,, | Performed by: NURSE PRACTITIONER

## 2022-09-16 PROCEDURE — 3078F PR MOST RECENT DIASTOLIC BLOOD PRESSURE < 80 MM HG: ICD-10-PCS | Mod: CPTII,,, | Performed by: NURSE PRACTITIONER

## 2022-09-16 PROCEDURE — 99214 OFFICE O/P EST MOD 30 MIN: CPT | Mod: PBBFAC | Performed by: NURSE PRACTITIONER

## 2022-09-16 PROCEDURE — 86708 HEPATITIS A ANTIBODY: CPT | Performed by: NURSE PRACTITIONER

## 2022-09-16 PROCEDURE — 3008F BODY MASS INDEX DOCD: CPT | Mod: CPTII,,, | Performed by: NURSE PRACTITIONER

## 2022-09-16 PROCEDURE — 86707 HEPATITIS BE ANTIBODY: CPT | Performed by: NURSE PRACTITIONER

## 2022-09-16 RX ORDER — ONDANSETRON 8 MG/1
TABLET, ORALLY DISINTEGRATING ORAL
COMMUNITY
Start: 2022-07-28 | End: 2023-07-03

## 2022-09-16 NOTE — PROGRESS NOTES
Patient here for FibroScan visit. Reports NPO X3 hours and denies any implanted electronic devices. Denies being pregnant. Future MIL in room during scan. Position patient for the procedure to expose the right rib cage. Explained procedure to the patient. FibroScan exam complete and was tolerated without any problems.

## 2022-09-16 NOTE — PROGRESS NOTES
"Subjective:       Patient ID: Raegan Paez is a 41 y.o. female.    Chief Complaint: New referral (Hep B)    9/16/22  Raegan is a 40 yo WF referred to [facility] for HBV by PCP LAVONNE Macedo NP.  She tells me that she was first told that she is a Hep B "carrier" during pregnancy.  She has never received treatment for this.  She states that her father & sister had hepatitis B as well.  She denies any IVDU or known direct exposures to their blood.  She is not sexually active. She does not drink alcohol or use any illicit drugs. Labs collected 7/5/22 Hep B s ag +, HBV , AST 13, ALT 14, . RUQ abd u/s 7/19/22 WNL.  She is amenable to FibroScan today.  She appreciates flu vax today as well.  Will get additional labs today for further evaluation & have her return in short interval for results.  She knows that she has a strong family history of cancers, but not sure which ones.  Will inquire and bring this information to next visit.  Declines flu vaccine as she is not able to take vaccines. All questions answered & concerns addressed.         Review of Systems   Constitutional: Negative.    HENT: Negative.     Eyes: Negative.    Respiratory: Negative.     Cardiovascular: Negative.    Gastrointestinal: Negative.    Genitourinary: Negative.    Integumentary:  Negative.   Neurological: Negative.    Psychiatric/Behavioral: Negative.     All other systems reviewed and are negative.      Objective:      Physical Exam  Vitals reviewed.   Constitutional:       General: She is not in acute distress.     Appearance: Normal appearance. She is not toxic-appearing.   Eyes:      General: No scleral icterus.  Cardiovascular:      Rate and Rhythm: Normal rate and regular rhythm.      Heart sounds: Normal heart sounds.   Pulmonary:      Effort: Pulmonary effort is normal. No respiratory distress.      Breath sounds: Normal breath sounds.   Abdominal:      General: Bowel sounds are normal. There is no distension.      Palpations: " Abdomen is soft. There is no mass.      Tenderness: There is no abdominal tenderness.   Musculoskeletal:         General: Normal range of motion.   Skin:     General: Skin is warm and dry.   Neurological:      Mental Status: She is alert and oriented to person, place, and time.       Assessment:       Problem List Items Addressed This Visit    None  Visit Diagnoses       Chronic hepatitis B    -  Primary    Relevant Orders    CBC Auto Differential (Completed)    Comprehensive Metabolic Panel (Completed)    Protime-INR    Hepatitis B DNA, Quant    Hepatitis B Core Antibody, Total (Completed)    Hepatitis A antibody, IgG (Completed)    Hepatitis B e Antigen    Hepatitis B e antibody    Hepatitis B Surface Ab, Qualitative (Completed)    Hepatitis B Surface Antigen (Completed)    US Elastography Liver    Hepatitis B Surface Antigen, Confirmation    Hep B w/o coma                  Plan:           Chronic hepatitis B  -     CBC Auto Differential; Future; Expected date: 09/16/2022  -     Comprehensive Metabolic Panel  -     Protime-INR; Future; Expected date: 09/16/2022  -     Hepatitis B DNA, Quant; Future; Expected date: 12/16/2022  -     Hepatitis B Core Antibody, Total; Future; Expected date: 09/16/2022  -     Hepatitis A antibody, IgG; Future; Expected date: 09/16/2022  -     Hepatitis B e Antigen; Future; Expected date: 09/16/2022  -     Hepatitis B e antibody; Future; Expected date: 09/16/2022  -     Hepatitis B Surface Ab, Qualitative; Future; Expected date: 09/16/2022  -     Hepatitis B Surface Antigen; Future; Expected date: 09/16/2022  -     US Elastography Liver; Future; Expected date: 09/16/2022  -     Hepatitis B Surface Antigen, Confirmation  Blood precautions, do not share a razor, needle, toothbrush, or clippers with anyone.    Use condoms for sexual encounters.   Vaccination of all household members and close contacts strongly encouraged.  Avoid or minimize all alcohol intake.   Limit Tylenol use to 2  grams per day.   RUQ abdominal ultrasound every 6 months for liver cancer screening.   Lower fat diet, higher fiber diet.   Additional labs today as ordered.  FibroScan today, preliminary report S1, F0-1.  RTC 2 weeks with Amanda.

## 2022-09-20 ENCOUNTER — PATIENT MESSAGE (OUTPATIENT)
Dept: INFECTIOUS DISEASES | Facility: CLINIC | Age: 42
End: 2022-09-20
Payer: MEDICAID

## 2022-09-20 LAB
HBV E AG SERPL QL IA: NEGATIVE
HBV E IGG SER QL IA: POSITIVE

## 2022-09-27 ENCOUNTER — PATIENT MESSAGE (OUTPATIENT)
Dept: INTERNAL MEDICINE | Facility: CLINIC | Age: 42
End: 2022-09-27
Payer: MEDICAID

## 2022-10-03 ENCOUNTER — PATIENT MESSAGE (OUTPATIENT)
Dept: ADMINISTRATIVE | Facility: HOSPITAL | Age: 42
End: 2022-10-03
Payer: MEDICAID

## 2022-10-04 ENCOUNTER — PATIENT MESSAGE (OUTPATIENT)
Dept: INTERNAL MEDICINE | Facility: CLINIC | Age: 42
End: 2022-10-04
Payer: MEDICAID

## 2022-10-04 RX ORDER — TRIAMCINOLONE ACETONIDE 1 MG/G
CREAM TOPICAL 2 TIMES DAILY
Qty: 15 G | Refills: 2 | Status: SHIPPED | OUTPATIENT
Start: 2022-10-04

## 2022-10-04 NOTE — TELEPHONE ENCOUNTER
Pt friend Callie stated that the cream is not at the pharmacy and to give her a call to keep her updated the number is [phone]

## 2022-11-01 ENCOUNTER — OFFICE VISIT (OUTPATIENT)
Dept: INFECTIOUS DISEASES | Facility: CLINIC | Age: 42
End: 2022-11-01
Payer: MEDICAID

## 2022-11-01 VITALS
HEART RATE: 79 BPM | HEIGHT: 58 IN | DIASTOLIC BLOOD PRESSURE: 78 MMHG | SYSTOLIC BLOOD PRESSURE: 113 MMHG | RESPIRATION RATE: 16 BRPM | BODY MASS INDEX: 23.82 KG/M2 | WEIGHT: 113.5 LBS | TEMPERATURE: 99 F

## 2022-11-01 DIAGNOSIS — Z23 NEED FOR VACCINATION: ICD-10-CM

## 2022-11-01 DIAGNOSIS — B18.1 CHRONIC HEPATITIS B: Primary | ICD-10-CM

## 2022-11-01 PROCEDURE — 99214 OFFICE O/P EST MOD 30 MIN: CPT | Mod: PBBFAC | Performed by: NURSE PRACTITIONER

## 2022-11-01 PROCEDURE — 1159F PR MEDICATION LIST DOCUMENTED IN MEDICAL RECORD: ICD-10-PCS | Mod: CPTII,,, | Performed by: NURSE PRACTITIONER

## 2022-11-01 PROCEDURE — 1160F RVW MEDS BY RX/DR IN RCRD: CPT | Mod: CPTII,,, | Performed by: NURSE PRACTITIONER

## 2022-11-01 PROCEDURE — 1159F MED LIST DOCD IN RCRD: CPT | Mod: CPTII,,, | Performed by: NURSE PRACTITIONER

## 2022-11-01 PROCEDURE — 3074F SYST BP LT 130 MM HG: CPT | Mod: CPTII,,, | Performed by: NURSE PRACTITIONER

## 2022-11-01 PROCEDURE — 99213 OFFICE O/P EST LOW 20 MIN: CPT | Mod: S$PBB,,, | Performed by: NURSE PRACTITIONER

## 2022-11-01 PROCEDURE — 3078F DIAST BP <80 MM HG: CPT | Mod: CPTII,,, | Performed by: NURSE PRACTITIONER

## 2022-11-01 PROCEDURE — 3074F PR MOST RECENT SYSTOLIC BLOOD PRESSURE < 130 MM HG: ICD-10-PCS | Mod: CPTII,,, | Performed by: NURSE PRACTITIONER

## 2022-11-01 PROCEDURE — 3078F PR MOST RECENT DIASTOLIC BLOOD PRESSURE < 80 MM HG: ICD-10-PCS | Mod: CPTII,,, | Performed by: NURSE PRACTITIONER

## 2022-11-01 PROCEDURE — 1160F PR REVIEW ALL MEDS BY PRESCRIBER/CLIN PHARMACIST DOCUMENTED: ICD-10-PCS | Mod: CPTII,,, | Performed by: NURSE PRACTITIONER

## 2022-11-01 PROCEDURE — 99213 PR OFFICE/OUTPT VISIT, EST, LEVL III, 20-29 MIN: ICD-10-PCS | Mod: S$PBB,,, | Performed by: NURSE PRACTITIONER

## 2022-11-01 PROCEDURE — 90471 IMMUNIZATION ADMIN: CPT | Mod: PBBFAC

## 2022-11-01 RX ORDER — DICLOFENAC SODIUM 75 MG/1
75 TABLET, DELAYED RELEASE ORAL 2 TIMES DAILY
COMMUNITY
Start: 2022-10-14 | End: 2023-05-06

## 2022-11-01 NOTE — PROGRESS NOTES
"Subjective:       Patient ID: Raegan Paez is a 42 y.o. female.    Chief Complaint: Hepatitis B    11/1/22  Raegan is a 41 yo WF presenting today for HBV lab results.  She is doing well today and denies any jaundice, icterus, nausea, vomiting, dark urine, or santhosh colored stools.  No family history of liver cancer.  She is amenable to Hep A vaccination dose #1 today, declines flu vaccine.  Lab results reviewed, chronic inactive infection.  Will continue to monitor    9/16/22  Raegan is a 42 yo WF referred to [facility] for HBV by PCP LAVONNE Macedo NP.  She tells me that she was first told that she is a Hep B "carrier" during pregnancy.  She has never received treatment for this.  She states that her father & sister had hepatitis B as well.  She denies any IVDU or known direct exposures to their blood.  She is not sexually active. She does not drink alcohol or use any illicit drugs. Labs collected 7/5/22 Hep B s ag +, HBV , AST 13, ALT 14, . RUQ abd u/s 7/19/22 WNL.  She is amenable to FibroScan today.  She appreciates flu vax today as well.  Will get additional labs today for further evaluation & have her return in short interval for results.  She knows that she has a strong family history of cancers, but not sure which ones.  Will inquire and bring this information to next visit.  Declines flu vaccine as she is not able to take vaccines. All questions answered & concerns addressed.     Review of Systems   Constitutional: Negative.    HENT: Negative.     Eyes: Negative.    Respiratory: Negative.     Cardiovascular: Negative.    Gastrointestinal: Negative.    Genitourinary: Negative.    Integumentary:  Negative.   Neurological: Negative.    Psychiatric/Behavioral: Negative.         Objective:      Physical Exam  Vitals reviewed.   Constitutional:       General: She is not in acute distress.     Appearance: Normal appearance. She is not toxic-appearing.   Eyes:      General: No scleral " icterus.  Cardiovascular:      Rate and Rhythm: Normal rate and regular rhythm.      Heart sounds: Normal heart sounds.   Pulmonary:      Effort: Pulmonary effort is normal. No respiratory distress.      Breath sounds: Normal breath sounds.   Abdominal:      General: Bowel sounds are normal. There is no distension.      Palpations: Abdomen is soft. There is no mass.      Tenderness: There is no abdominal tenderness.   Musculoskeletal:         General: Normal range of motion.   Skin:     General: Skin is warm and dry.   Neurological:      Mental Status: She is alert and oriented to person, place, and time.       Assessment:       Problem List Items Addressed This Visit    None  Visit Diagnoses       Chronic hepatitis B    -  Primary    Relevant Orders    CBC Auto Differential    Comprehensive Metabolic Panel    Hepatitis B DNA, Quant    HIV 1/2 Ag/Ab (4th Gen)    Protime-INR    US Abdomen Limited    Need for vaccination                  Plan:         Chronic immune active hepatitis B  -     CBC Auto Differential; Future; Expected date: 04/01/2023  -     Comprehensive Metabolic Panel; Future; Expected date: 04/01/2023  -     Hepatitis B DNA, Quant; Future; Expected date: 04/01/2023  -     HIV 1/2 Ag/Ab (4th Gen); Future; Expected date: 04/01/2023  -     Protime-INR; Future; Expected date: 04/01/2023  -     US Abdomen Limited; Future; Expected date: 04/01/2023  Blood precautions, do not share a razor, needle, toothbrush, or clippers with anyone.    Use condoms for sexual encounters.   Vaccination of all household members and close contacts strongly encouraged.  Avoid or minimize all alcohol intake.   Limit Tylenol use to 2 grams per day.   Medication as prescribed.   RUQ abdominal ultrasound every 6 months for liver cancer screening.   Lower fat diet, higher fiber diet.   Labs & U/S in 5 months as ordered.  Repeat FibroScan next visit.  RTC 6 months with Amanda.      Need for vaccination  -     Hepatitis A Vaccine  (Adult) (IM)  Hep A #1 today, #2 next visit.

## 2022-11-04 ENCOUNTER — HOSPITAL ENCOUNTER (EMERGENCY)
Facility: HOSPITAL | Age: 42
Discharge: HOME | End: 2022-11-04
Attending: EMERGENCY MEDICINE
Payer: MEDICAID

## 2022-11-04 VITALS
BODY MASS INDEX: 24.34 KG/M2 | RESPIRATION RATE: 16 BRPM | HEART RATE: 83 BPM | HEIGHT: 58 IN | TEMPERATURE: 98 F | OXYGEN SATURATION: 99 % | WEIGHT: 115.94 LBS | DIASTOLIC BLOOD PRESSURE: 86 MMHG | SYSTOLIC BLOOD PRESSURE: 132 MMHG

## 2022-11-04 DIAGNOSIS — M25.572 ACUTE LEFT ANKLE PAIN: Primary | ICD-10-CM

## 2022-11-04 DIAGNOSIS — M25.572 LEFT ANKLE PAIN: ICD-10-CM

## 2022-11-04 PROCEDURE — 99283 EMERGENCY DEPT VISIT LOW MDM: CPT | Mod: 25

## 2022-11-04 RX ORDER — METHOCARBAMOL 500 MG/1
500 TABLET, FILM COATED ORAL 3 TIMES DAILY PRN
Qty: 15 TABLET | Refills: 0 | Status: SHIPPED | OUTPATIENT
Start: 2022-11-04 | End: 2022-11-09

## 2022-11-05 NOTE — ED PROVIDER NOTES
Encounter Date: 2022       History     Chief Complaint   Patient presents with    Ankle Pain     Ongoing left ankle pain x 1 month after an injury. Previously evaluated and found to have no fracture.     Raegan Paez is a 42 y.o. female with deperession and insomnia who presents to the ED complaining of left ankle pain. Patient reports that 1 month ago she tripped and felt like she sprained her left ankle. She was seen at an urgent care and Xrays were normal. She has had continued left ankle pain and swelling. Pain is worse with bearing weight and palpation. She denies any new trauma or injury. She denies numbness/tingling, fevers/chills, wound.    The history is provided by the patient. No  was used.   Review of patient's allergies indicates:   Allergen Reactions    Lortab [hydrocodone-acetaminophen]      Unsure    Tylenol [acetaminophen]      Unsure     Past Medical History:   Diagnosis Date    Depression     Hepatitis B     Insomnia      Past Surgical History:   Procedure Laterality Date     SECTION  Unsure    CHOLECYSTECTOMY  Unsure    HYSTERECTOMY      TUBAL LIGATION       Family History   Problem Relation Age of Onset    Hypertension Mother     Diabetes Father     Hepatitis Sister     Scoliosis Sister      Social History     Tobacco Use    Smoking status: Never    Smokeless tobacco: Never   Substance Use Topics    Alcohol use: Never    Drug use: Never     Review of Systems   Constitutional:  Negative for chills and fever.   HENT:  Negative for congestion and ear pain.    Eyes:  Negative for redness and itching.   Respiratory:  Negative for cough and shortness of breath.    Cardiovascular:  Negative for chest pain and leg swelling.   Gastrointestinal:  Negative for abdominal pain and diarrhea.   Genitourinary:  Negative for dysuria and frequency.   Musculoskeletal:  Positive for arthralgias and joint swelling. Negative for back pain.   Skin:  Negative for rash and  wound.   Neurological:  Negative for dizziness and headaches.   Psychiatric/Behavioral:  Negative for agitation and confusion.      Physical Exam     Initial Vitals [11/04/22 1800]   BP Pulse Resp Temp SpO2   129/86 85 16 98.1 °F (36.7 °C) 99 %      MAP       --         Physical Exam    Nursing note and vitals reviewed.  Constitutional: She appears well-developed and well-nourished. No distress.   HENT:   Head: Normocephalic and atraumatic.   Mouth/Throat: No oropharyngeal exudate.   Eyes: EOM are normal. No scleral icterus.   Neck: Neck supple.   Normal range of motion.  Cardiovascular:  Normal rate and regular rhythm.           No murmur heard.  Pulmonary/Chest: No respiratory distress. She has no wheezes.   Abdominal: Abdomen is soft. She exhibits no distension. There is no abdominal tenderness.   Musculoskeletal:         General: No tenderness or edema. Normal range of motion.      Cervical back: Normal range of motion and neck supple.      Comments: Left ankle without obvious deformity, mild TTP of left lateral foot. Full AROM without pain. No warmth or erythema. Pulses palpable.      Neurological: She is alert and oriented to person, place, and time. No cranial nerve deficit.   Skin: Skin is warm and dry. Capillary refill takes less than 2 seconds. No erythema.   Psychiatric: She has a normal mood and affect. Thought content normal.       ED Course   Procedures  Labs Reviewed   POCT URINE PREGNANCY          Imaging Results              X-Ray Foot Complete Left (Final result)  Result time 11/04/22 19:37:39      Final result by Yeison Suggs MD (11/04/22 19:37:39)                   Impression:      No displaced fracture      Electronically signed by: Yeison Suggs MD  Date:    11/04/2022  Time:    19:37               Narrative:    EXAMINATION:  Three views left foot    CLINICAL HISTORY:  Ankle pain    COMPARISON:  None    FINDINGS:  No displaced fracture or dislocation identified.  Soft tissues are normal.                                        Medications - No data to display  Medical Decision Making:   ED Management:  The patient is resting comfortably and in no acute distress. I personally discussed her test results and treatment plan.  Gave strict ED precautions and specific conditions for return to the emergency department and importance of follow up with pcp.  Patient voices understanding and agrees to the plan discussed. All patients' questions have been answered at this time. She has remained hemodynamically stable throughout entire stay in ED and is stable for discharge home.                        Clinical Impression:   Final diagnoses:  [M25.572] Left ankle pain  [M25.572] Acute left ankle pain (Primary)        ED Disposition Condition    Discharge Stable          ED Prescriptions       Medication Sig Dispense Start Date End Date Auth. Provider    methocarbamoL (ROBAXIN) 500 MG Tab Take 1 tablet (500 mg total) by mouth 3 (three) times daily as needed (pain, spasms). 15 tablet 11/4/2022 11/9/2022 Renetta Pettit PA-C          Follow-up Information       Follow up With Specialties Details Why Contact Info    [facility] In [facility] week Hospital follow up [address] [address]  [phone]      [facility] Emergency Medicine  If symptoms worsen [address] [address] [phone]             Renetta Pettit PA-C  11/04/22 1953

## 2023-01-18 ENCOUNTER — OFFICE VISIT (OUTPATIENT)
Dept: INTERNAL MEDICINE | Facility: CLINIC | Age: 43
End: 2023-01-18
Payer: MEDICAID

## 2023-01-18 VITALS
BODY MASS INDEX: 24.1 KG/M2 | HEIGHT: 58 IN | SYSTOLIC BLOOD PRESSURE: 122 MMHG | TEMPERATURE: 98 F | RESPIRATION RATE: 20 BRPM | DIASTOLIC BLOOD PRESSURE: 80 MMHG | HEART RATE: 79 BPM | WEIGHT: 114.81 LBS

## 2023-01-18 DIAGNOSIS — Z11.3 ROUTINE SCREENING FOR STI (SEXUALLY TRANSMITTED INFECTION): Primary | ICD-10-CM

## 2023-01-18 DIAGNOSIS — Z00.00 WELLNESS EXAMINATION: ICD-10-CM

## 2023-01-18 DIAGNOSIS — Z13.1 SCREENING FOR DIABETES MELLITUS: ICD-10-CM

## 2023-01-18 DIAGNOSIS — J06.9 UPPER RESPIRATORY TRACT INFECTION, UNSPECIFIED TYPE: ICD-10-CM

## 2023-01-18 PROCEDURE — 3074F PR MOST RECENT SYSTOLIC BLOOD PRESSURE < 130 MM HG: ICD-10-PCS | Mod: CPTII,,, | Performed by: NURSE PRACTITIONER

## 2023-01-18 PROCEDURE — 3074F SYST BP LT 130 MM HG: CPT | Mod: CPTII,,, | Performed by: NURSE PRACTITIONER

## 2023-01-18 PROCEDURE — 1159F MED LIST DOCD IN RCRD: CPT | Mod: CPTII,,, | Performed by: NURSE PRACTITIONER

## 2023-01-18 PROCEDURE — 99214 PR OFFICE/OUTPT VISIT, EST, LEVL IV, 30-39 MIN: ICD-10-PCS | Mod: S$PBB,,, | Performed by: NURSE PRACTITIONER

## 2023-01-18 PROCEDURE — 3008F PR BODY MASS INDEX (BMI) DOCUMENTED: ICD-10-PCS | Mod: CPTII,,, | Performed by: NURSE PRACTITIONER

## 2023-01-18 PROCEDURE — 3008F BODY MASS INDEX DOCD: CPT | Mod: CPTII,,, | Performed by: NURSE PRACTITIONER

## 2023-01-18 PROCEDURE — 99214 OFFICE O/P EST MOD 30 MIN: CPT | Mod: PBBFAC | Performed by: NURSE PRACTITIONER

## 2023-01-18 PROCEDURE — 3079F PR MOST RECENT DIASTOLIC BLOOD PRESSURE 80-89 MM HG: ICD-10-PCS | Mod: CPTII,,, | Performed by: NURSE PRACTITIONER

## 2023-01-18 PROCEDURE — 3079F DIAST BP 80-89 MM HG: CPT | Mod: CPTII,,, | Performed by: NURSE PRACTITIONER

## 2023-01-18 PROCEDURE — 1159F PR MEDICATION LIST DOCUMENTED IN MEDICAL RECORD: ICD-10-PCS | Mod: CPTII,,, | Performed by: NURSE PRACTITIONER

## 2023-01-18 PROCEDURE — 99214 OFFICE O/P EST MOD 30 MIN: CPT | Mod: S$PBB,,, | Performed by: NURSE PRACTITIONER

## 2023-01-18 RX ORDER — BENZONATATE 200 MG/1
200 CAPSULE ORAL EVERY 8 HOURS PRN
COMMUNITY
Start: 2023-01-11 | End: 2023-01-18

## 2023-01-18 RX ORDER — PROMETHAZINE HYDROCHLORIDE AND DEXTROMETHORPHAN HYDROBROMIDE 6.25; 15 MG/5ML; MG/5ML
5 SYRUP ORAL EVERY 6 HOURS PRN
Qty: 118 ML | Refills: 0 | Status: SHIPPED | OUTPATIENT
Start: 2023-01-18 | End: 2023-01-28

## 2023-01-18 RX ORDER — AZITHROMYCIN 250 MG/1
TABLET, FILM COATED ORAL
Qty: 6 TABLET | Refills: 0 | Status: SHIPPED | OUTPATIENT
Start: 2023-01-18 | End: 2023-01-23

## 2023-01-18 NOTE — PROGRESS NOTES
"  [facility]   [facility]  [address] [address]      PATIENT NAME: Raegan Paez  : 1980  DATE: 23  MRN: [id number]      Billing Provider: [facility]  Level of Service:   Patient PCP Information       Provider PCP Type    [facility]            Reason for Visit / Chief Complaint: Follow-up (Left foot problems) and Cough       History of Present Illness / Problem Focused Workflow     Raegan Paez presents to the clinic with Follow-up (Left foot problems) and Cough     2022: 41 y.o.  presenting to [facility] to establish primary care.     Previous PCP: Jeffery Ma NP in [place]  PmHx: Anxiety and depression, intentional overdose of acetaminophen with subsequent coma/temporary tracheostomy (resolved)  SHx: see below  FHx: see below. Reports h/o cancer on both sides of the family but cannot ascertain any specific types  Complaints today: Establish care. Patient lives with friends who come to clinic here, so she wanted to switch services to here as well. She has a h/o anxiety, depression and insomnia. She's prescribed Klonopin TID per last PCP for anxiety and "sleep." She was in a coma about 10-11 years ago after her "ex- made me overdose on Norco and Tylenol." She was pregnant with twins at the time and miscarried. Also had a tracheostomy which was removed.  [place] [facility] hasn't taken Tylenol since that time. She follows [facility] for women's needs. [facility] last appt a few months ago. Reportedly had a negative pap smear. No acute concerns.    2022: Raegan is presenting to review labs. Lab review revealed: CBC unremarkable, glucose 103, A1c 4.9, FLP WNL, TSH WNL, UA with some WBCs and occ bacteria (urine culture no growth; patient denies urinary concerns), Hepatitis B surface antigen reactive. "The presence of " "hepatitis B surface antigen indicates acute or chronic hepatitis B infection." Today, patient reports that she's always been told "I was a carrier." States father is a carrier and sister has hepatitis B. She has no abdominal pain, fatigue, appetite changes, abd swelling, jaundice, dark urine, etc. No other concerns.    Note, patient following psychiatry outside of this facility.     1/18/23: Raegan is presenting for routine f/u. Accompanied by mother-in-law, Callie. States not sure why she's here today. She did visit the ED in November with L ankle pain after twisting ankle. Was told she sprained ankle and was referred to Ortho. By the time Ortho call, pain had resolved. She continues to follow ID for chronic Hep B which is currently being monitored. She had a negative mammogram in September. Today, her only concern is ongoing dry cough, nasal congestion, and throat irritation x 2 weeks. Visited a local UC at start of sx and was given benzonatate only. This is not effective. She does have sick contacts.     URI   This is a new problem. The current episode started 1 to 4 weeks ago. The problem has been waxing and waning. There has been no fever. Associated symptoms include congestion, coughing, rhinorrhea, sneezing and a sore throat. Pertinent negatives include no abdominal pain, chest pain, diarrhea, dysuria, ear pain, headaches, joint pain, joint swelling, nausea, neck pain, plugged ear sensation, rash, swollen glands or vomiting. Treatments tried: Benzonatate. The treatment provided no relief.     Review of Systems     Review of Systems   Constitutional: Negative.  Negative for chills, diaphoresis, fatigue and fever.   HENT:  Positive for congestion, rhinorrhea, sneezing and sore throat. Negative for ear pain.    Eyes: Negative.    Respiratory:  Positive for cough.    Cardiovascular: Negative.  Negative for chest pain.   Gastrointestinal: Negative.  Negative for abdominal pain, diarrhea, nausea and vomiting. "   Endocrine: Negative.    Genitourinary: Negative.  Negative for dysuria.   Musculoskeletal: Negative.  Negative for joint pain and neck pain.   Skin: Negative.  Negative for rash.   Allergic/Immunologic: Negative.    Neurological: Negative.  Negative for headaches.   Hematological: Negative.    Psychiatric/Behavioral: Negative.       Medical / Social / Family History     Past Medical History:   Diagnosis Date    Depression     Hepatitis B     Insomnia        Past Surgical History:   Procedure Laterality Date     SECTION  Unsure    CHOLECYSTECTOMY  Unsure    HYSTERECTOMY      TUBAL LIGATION         Social History  Ms.  reports that she has never smoked. She has never used smokeless tobacco. She reports that she does not drink alcohol and does not use drugs.    Family History  Ms.'s family history includes Diabetes in her father; Hepatitis in her sister; Hypertension in her mother; Scoliosis in her sister.    Medications and Allergies     Medications  Medication List with Changes/Refills   New Medications    AZITHROMYCIN (Z-IRA) 250 MG TABLET    Take 2 tablets by mouth on day 1; Take 1 tablet by mouth on days 2-5    PROMETHAZINE-DEXTROMETHORPHAN (PROMETHAZINE-DM) 6.25-15 MG/5 ML SYRP    Take 5 mLs by mouth every 6 (six) hours as needed (cough).   Current Medications    CLONAZEPAM (KLONOPIN) 0.5 MG TABLET    Take 0.5 mg by mouth 3 (three) times daily.    DICLOFENAC (VOLTAREN) 75 MG EC TABLET    Take 75 mg by mouth 2 (two) times daily.    FLUOXETINE 20 MG CAPSULE    Take 20 mg by mouth once daily.    FLUOXETINE 40 MG CAPSULE    Take 40 mg by mouth once daily.    ONDANSETRON (ZOFRAN-ODT) 8 MG TBDL    TAKE 1 TABLET DISSOLVED IN MOUTH EVERY 8 HOURS    TRAZODONE (DESYREL) 50 MG TABLET    Take 50 mg by mouth nightly.    TRIAMCINOLONE ACETONIDE 0.1% (KENALOG) 0.1 % CREAM    Apply topically 2 (two) times daily.   Discontinued Medications    BENZONATATE (TESSALON) 200 MG CAPSULE    Take 200 mg by mouth every 8  (eight) hours as needed.       Allergies  Review of patient's allergies indicates:   Allergen Reactions    Lortab [hydrocodone-acetaminophen]      Unsure    Tylenol [acetaminophen]      Unsure       Physical Examination     Vitals:    01/18/23 0855   BP: 122/80   Pulse: 79   Resp: 20   Temp: 98.2 °F (36.8 °C)     Physical Exam  Constitutional:       Appearance: Normal appearance.   HENT:      Head: Normocephalic and atraumatic.      Right Ear: Tympanic membrane, ear canal and external ear normal.      Left Ear: Tympanic membrane, ear canal and external ear normal.      Nose: Congestion present.      Mouth/Throat:      Mouth: Mucous membranes are moist.      Pharynx: Posterior oropharyngeal erythema present.   Eyes:      Extraocular Movements: Extraocular movements intact.   Neck:      Comments: Trach scar  Cardiovascular:      Rate and Rhythm: Normal rate and regular rhythm.   Pulmonary:      Effort: Pulmonary effort is normal. No respiratory distress.      Breath sounds: Normal breath sounds. No stridor. No wheezing, rhonchi or rales.      Comments: cough  Chest:      Chest wall: No tenderness.   Abdominal:      Palpations: Abdomen is soft.   Musculoskeletal:         General: Normal range of motion.      Cervical back: Normal range of motion and neck supple.   Skin:     General: Skin is warm and dry.   Neurological:      General: No focal deficit present.      Mental Status: She is alert and oriented to person, place, and time. Mental status is at baseline.   Psychiatric:         Mood and Affect: Mood normal.         Behavior: Behavior normal.         Thought Content: Thought content normal.         Judgment: Judgment normal.         Results     Lab Results   Component Value Date    WBC 7.9 09/16/2022    RBC 4.97 09/16/2022    HGB 15.2 09/16/2022    HCT 47.3 (H) 09/16/2022    MCV 95.2 (H) 09/16/2022    MCH 30.6 09/16/2022    MCHC 32.1 (L) 09/16/2022    RDW 13.0 09/16/2022     09/16/2022    MPV 10.9 (H)  09/16/2022     CMP  Sodium Level   Date Value Ref Range Status   09/16/2022 141 136 - 145 mmol/L Final     Potassium Level   Date Value Ref Range Status   09/16/2022 4.8 3.5 - 5.1 mmol/L Final     Carbon Dioxide   Date Value Ref Range Status   09/16/2022 26 22 - 29 mmol/L Final     Blood Urea Nitrogen   Date Value Ref Range Status   09/16/2022 11.6 7.0 - 18.7 mg/dL Final     Creatinine   Date Value Ref Range Status   09/16/2022 0.75 0.55 - 1.02 mg/dL Final     Calcium Level Total   Date Value Ref Range Status   09/16/2022 10.0 8.4 - 10.2 mg/dL Final     Albumin Level   Date Value Ref Range Status   09/16/2022 4.5 3.5 - 5.0 gm/dL Final     Bilirubin Total   Date Value Ref Range Status   09/16/2022 0.7 <=1.5 mg/dL Final     Alkaline Phosphatase   Date Value Ref Range Status   09/16/2022 95 40 - 150 unit/L Final     Aspartate Aminotransferase   Date Value Ref Range Status   09/16/2022 13 5 - 34 unit/L Final     Alanine Aminotransferase   Date Value Ref Range Status   09/16/2022 17 0 - 55 unit/L Final     Estimated GFR-Non    Date Value Ref Range Status   07/05/2022 >60 mls/min/1.73/m2 Final     Lab Results   Component Value Date    CHOL 189 07/05/2022     Lab Results   Component Value Date    HDL 52 07/05/2022     No results found for: LDLCALC  Lab Results   Component Value Date    TRIG 120 07/05/2022     No results found for: CHOLHDL  Lab Results   Component Value Date    TSH 3.4065 07/05/2022     Lab Results   Component Value Date    PROTEINUA Negative 07/05/2022    LEUKOCYTESUR 25 (A) 07/05/2022           Assessment and Plan (including Health Maintenance)     Plan:         Health Maintenance Due   Topic Date Due    COVID-19 Vaccine (1) Never done    Pneumococcal Vaccines (Age 0-64) (1 - PCV) Never done    TETANUS VACCINE  Never done    Influenza Vaccine (1) Never done       Problem List Items Addressed This Visit          ENT    URI (upper respiratory infection)    Current Assessment & Plan      Rest  Drink plenty of fluids (water) to stay hydrated  Take medications as prescribed  Call if no improvement  UC or ED for emergent concerns         Relevant Medications    promethazine-dextromethorphan (PROMETHAZINE-DM) 6.25-15 mg/5 mL Syrp    azithromycin (Z-IRA) 250 MG tablet       Other    Wellness examination    Overview     Breast Cancer Screening: Mammogram BI-RADS: 1 Negative, 9/8/2022         Relevant Orders    Urinalysis, Reflex to Urine Culture    TSH    Lipid Panel    Comprehensive Metabolic Panel    CBC Auto Differential     Other Visit Diagnoses       Routine screening for STI (sexually transmitted infection)    -  Primary    Relevant Orders    SYPHILIS ANTIBODY (WITH REFLEX RPR)    HIV 1/2 Ag/Ab (4th Gen)    Chlamydia/GC, PCR    Screening for diabetes mellitus        Relevant Orders    Hemoglobin A1C            Health Maintenance Topics with due status: Not Due       Topic Last Completion Date    Mammogram 09/08/2022       Future Appointments   Date Time Provider Department Center   5/3/2023  9:50 AM [facility]   7/18/2023  1:00 PM [facility]            Signature:  [facility]  [facility]  [address] [address]    Date of encounter: 1/18/23

## 2023-01-18 NOTE — ASSESSMENT & PLAN NOTE
Rest  Drink plenty of fluids (water) to stay hydrated  Take medications as prescribed  Call if no improvement  UC or ED for emergent concerns

## 2023-02-15 ENCOUNTER — TELEPHONE (OUTPATIENT)
Dept: INFECTIOUS DISEASES | Facility: CLINIC | Age: 43
End: 2023-02-15
Payer: MEDICAID

## 2023-02-15 NOTE — TELEPHONE ENCOUNTER
----- Message from Eileen Kelsey sent at 2/15/2023  8:59 AM CST -----  Regarding: pt call  Amanda Szymanski called regarding an US, asking for a call back @ [phone]

## 2023-02-15 NOTE — TELEPHONE ENCOUNTER
"Phoned patient. States "someone" called her about U/S scheduling. Did not remember that being mentioned. Decided that it should be done. Scheduling contacted. Apt 02/23/2023 at 8:30a.m. Arrive at 8:00a.m. fasting 6-8 hours prior to apt. Patient notified. Verbalized understanding.  "

## 2023-02-18 NOTE — PROCEDURES
Fibroscan Procedure     Name: Raegan Paez  Date of Procedure : 2022  Interpreting Physician: Tiffanie Acevedo MD, MPH  Diagnosis: HBV    Probe: XL    Fibroscan readin.1 kPa    Fibrosis: F 0-1     CAP readin dB/m    Steatosis: S1      Miscellaneous:

## 2023-03-06 ENCOUNTER — OFFICE VISIT (OUTPATIENT)
Dept: INTERNAL MEDICINE | Facility: CLINIC | Age: 43
End: 2023-03-06
Payer: MEDICAID

## 2023-03-06 DIAGNOSIS — T88.7XXA SIDE EFFECT OF MEDICATION: ICD-10-CM

## 2023-03-06 PROCEDURE — 1159F PR MEDICATION LIST DOCUMENTED IN MEDICAL RECORD: ICD-10-PCS | Mod: CPTII,95,, | Performed by: NURSE PRACTITIONER

## 2023-03-06 PROCEDURE — 99213 PR OFFICE/OUTPT VISIT, EST, LEVL III, 20-29 MIN: ICD-10-PCS | Mod: 95,,, | Performed by: NURSE PRACTITIONER

## 2023-03-06 PROCEDURE — 99213 OFFICE O/P EST LOW 20 MIN: CPT | Mod: 95,,, | Performed by: NURSE PRACTITIONER

## 2023-03-06 PROCEDURE — 1160F RVW MEDS BY RX/DR IN RCRD: CPT | Mod: CPTII,95,, | Performed by: NURSE PRACTITIONER

## 2023-03-06 PROCEDURE — 1160F PR REVIEW ALL MEDS BY PRESCRIBER/CLIN PHARMACIST DOCUMENTED: ICD-10-PCS | Mod: CPTII,95,, | Performed by: NURSE PRACTITIONER

## 2023-03-06 PROCEDURE — 1159F MED LIST DOCD IN RCRD: CPT | Mod: CPTII,95,, | Performed by: NURSE PRACTITIONER

## 2023-03-06 RX ORDER — METRONIDAZOLE 7.5 MG/G
1 GEL VAGINAL NIGHTLY
COMMUNITY
Start: 2023-02-24

## 2023-03-06 RX ORDER — VENLAFAXINE HYDROCHLORIDE 37.5 MG/1
37.5 CAPSULE, EXTENDED RELEASE ORAL DAILY
COMMUNITY
Start: 2023-02-01

## 2023-03-06 NOTE — ASSESSMENT & PLAN NOTE
Patient unable to tolerate oral metronidazole but able to tolerate gel. No further complaints. Allergy list updated.

## 2023-03-06 NOTE — PROGRESS NOTES
"  PEBBLES Chávez   [facility]  [address] [facility] [address]      PATIENT NAME: Raegan Paez  : 1980  DATE: 3/6/23  MRN: [id number]      Billing Provider: PEBBLES Chávez  Level of Service:   Patient PCP Information       Provider PCP Type    PEBBLES Chávez General            Reason for Visit / Chief Complaint: Follow-up (Medication reaction)       History of Present Illness / Problem Focused Workflow     Raegan Paez presents to the clinic with Follow-up (Medication reaction)     2022: 41 y.o.  presenting to [facility] to establish primary care.     Previous PCP: Jeffery Ma NP in [place]  PmHx: Anxiety and depression, intentional overdose of acetaminophen with subsequent coma/temporary tracheostomy (resolved)  SHx: see below  FHx: see below. Reports h/o cancer on both sides of the family but cannot ascertain any specific types  Complaints today: Establish care. Patient lives with friends who come to clinic here, so she wanted to switch services to here as well. She has a h/o anxiety, depression and insomnia. She's prescribed Klonopin TID per last PCP for anxiety and "sleep." She was in a coma about 10-11 years ago after her "ex- made me overdose on Norco and Tylenol." She was pregnant with twins at the time and miscarried. Also had a tracheostomy which was removed.  [place] [facility] hasn't taken Tylenol since that time. She follows [facility] for women's needs. [facility] last appt a few months ago. Reportedly had a negative pap smear. No acute concerns.    2022: Raegan is presenting to review labs. Lab review revealed: CBC unremarkable, glucose 103, A1c 4.9, FLP WNL, TSH WNL, UA with some WBCs and occ bacteria (urine culture no growth; patient denies urinary concerns), Hepatitis B surface antigen reactive. "The presence of hepatitis B surface antigen " "indicates acute or chronic hepatitis B infection." Today, patient reports that she's always been told "I was a carrier." States father is a carrier and sister has hepatitis B. She has no abdominal pain, fatigue, appetite changes, abd swelling, jaundice, dark urine, etc. No other concerns.    Note, patient following psychiatry outside of this facility.     23: Raegan is presenting for routine f/u. Accompanied by mother-in-law, Callie. States not sure why she's here today. She did visit the ED in November with L ankle pain after twisting ankle. Was told she sprained ankle and was referred to Ortho. By the time Ortho call, pain had resolved. She continues to follow ID for chronic Hep B which is currently being monitored. She had a negative mammogram in September. Today, her only concern is ongoing dry cough, nasal congestion, and throat irritation x 2 weeks. Visited a local UC at start of sx and was given benzonatate only. This is not effective. She does have sick contacts.     3/6/23: Patient presenting with concerns about a medication side effect. [facility] relates [facility] [place], [place], across from the hospital, for a routine pap.  was diagnosed with a vaginal infection and prescribed oral metronidazole 23.  took first dose, then experienced wheezing and trouble breathing shortly after. She called EMS and was transported to the ED. States, "They did a EKG and everything was fine." She was prescribed Metrogel which shows filled 23 and states "I've been doing fine with it." No other concerns.      Review of Systems     Review of Systems   All other systems reviewed and are negative.    Medical / Social / Family History     Past Medical History:   Diagnosis Date    Depression     Hepatitis B     Insomnia        Past Surgical History:   Procedure Laterality Date     SECTION  Unsure    CHOLECYSTECTOMY  Unsure    HYSTERECTOMY      TUBAL LIGATION         Social " History  Ms.  reports that she has never smoked. She has never been exposed to tobacco smoke. She has never used smokeless tobacco. She reports that she does not drink alcohol and does not use drugs.    Family History  Ms.'s family history includes Diabetes in her father; Hepatitis in her sister; Hypertension in her mother; Scoliosis in her sister.    Medications and Allergies     Medications  Medication List with Changes/Refills   Current Medications    CLONAZEPAM (KLONOPIN) 0.5 MG TABLET    Take 0.5 mg by mouth 3 (three) times daily.    DICLOFENAC (VOLTAREN) 75 MG EC TABLET    Take 75 mg by mouth 2 (two) times daily.    FLUOXETINE 20 MG CAPSULE    Take 20 mg by mouth once daily.    FLUOXETINE 40 MG CAPSULE    Take 40 mg by mouth once daily.    METRONIDAZOLE (METROGEL) 0.75 % (37.5MG/5 GRAM) VAGINAL GEL    Place 1 applicator vaginally every evening.    ONDANSETRON (ZOFRAN-ODT) 8 MG TBDL    TAKE 1 TABLET DISSOLVED IN MOUTH EVERY 8 HOURS    TRAZODONE (DESYREL) 50 MG TABLET    Take 50 mg by mouth nightly.    TRIAMCINOLONE ACETONIDE 0.1% (KENALOG) 0.1 % CREAM    Apply topically 2 (two) times daily.    VENLAFAXINE (EFFEXOR-XR) 37.5 MG 24 HR CAPSULE    Take 37.5 mg by mouth.       Allergies  Review of patient's allergies indicates:   Allergen Reactions    Metronidazole Shortness Of Breath    Lortab [hydrocodone-acetaminophen]      Unsure    Tylenol [acetaminophen]      Unsure       Physical Examination     There were no vitals filed for this visit.    Physical Exam  Constitutional:       Appearance: Normal appearance.   HENT:      Head: Normocephalic and atraumatic.   Pulmonary:      Effort: Pulmonary effort is normal.   Musculoskeletal:      Cervical back: Normal range of motion.   Neurological:      General: No focal deficit present.      Mental Status: She is alert and oriented to person, place, and time.   Psychiatric:         Mood and Affect: Mood normal.         Behavior: Behavior normal.         Thought Content:  Thought content normal.         Judgment: Judgment normal.         Results     Lab Results   Component Value Date    WBC 7.9 09/16/2022    RBC 4.97 09/16/2022    HGB 15.2 09/16/2022    HCT 47.3 (H) 09/16/2022    MCV 95.2 (H) 09/16/2022    MCH 30.6 09/16/2022    MCHC 32.1 (L) 09/16/2022    RDW 13.0 09/16/2022     09/16/2022    MPV 10.9 (H) 09/16/2022     CMP  Sodium Level   Date Value Ref Range Status   09/16/2022 141 136 - 145 mmol/L Final     Potassium Level   Date Value Ref Range Status   09/16/2022 4.8 3.5 - 5.1 mmol/L Final     Carbon Dioxide   Date Value Ref Range Status   09/16/2022 26 22 - 29 mmol/L Final     Blood Urea Nitrogen   Date Value Ref Range Status   09/16/2022 11.6 7.0 - 18.7 mg/dL Final     Creatinine   Date Value Ref Range Status   09/16/2022 0.75 0.55 - 1.02 mg/dL Final     Calcium Level Total   Date Value Ref Range Status   09/16/2022 10.0 8.4 - 10.2 mg/dL Final     Albumin Level   Date Value Ref Range Status   09/16/2022 4.5 3.5 - 5.0 gm/dL Final     Bilirubin Total   Date Value Ref Range Status   09/16/2022 0.7 <=1.5 mg/dL Final     Alkaline Phosphatase   Date Value Ref Range Status   09/16/2022 95 40 - 150 unit/L Final     Aspartate Aminotransferase   Date Value Ref Range Status   09/16/2022 13 5 - 34 unit/L Final     Alanine Aminotransferase   Date Value Ref Range Status   09/16/2022 17 0 - 55 unit/L Final     Estimated GFR-Non    Date Value Ref Range Status   07/05/2022 >60 mls/min/1.73/m2 Final     Lab Results   Component Value Date    CHOL 189 07/05/2022     Lab Results   Component Value Date    HDL 52 07/05/2022     No results found for: LDLCALC  Lab Results   Component Value Date    TRIG 120 07/05/2022     No results found for: CHOLHDL  Lab Results   Component Value Date    TSH 3.4065 07/05/2022     Lab Results   Component Value Date    PROTEINUA Negative 07/05/2022    LEUKOCYTESUR 25 (A) 07/05/2022           Assessment and Plan (including Health Maintenance)      Plan:         Health Maintenance Due   Topic Date Due    COVID-19 Vaccine (1) Never done    Pneumococcal Vaccines (Age 0-64) (1 - PCV) Never done    TETANUS VACCINE  Never done    Influenza Vaccine (1) Never done       Problem List Items Addressed This Visit          Other    Side effect of medication    Current Assessment & Plan     Patient unable to tolerate oral metronidazole but able to tolerate gel. No further complaints. Allergy list updated.          [facility] complaints.    Health Maintenance Topics with due status: Not Due       Topic Last Completion Date    Mammogram 09/08/2022       Future Appointments   Date Time Provider Department Center   4/3/2023  9:[address]    5/3/2023  9:50 AM [facility]   7/18/2023  1:00 PM [facility]            Signature:  PEBBLES Chávez  [facility]  [address] [facility] [address]    Date of encounter: 3/6/23

## 2023-04-03 ENCOUNTER — HOSPITAL ENCOUNTER (OUTPATIENT)
Dept: RADIOLOGY | Facility: HOSPITAL | Age: 43
Discharge: HOME | End: 2023-04-03
Attending: NURSE PRACTITIONER
Payer: MEDICAID

## 2023-04-03 DIAGNOSIS — B18.1 CHRONIC HEPATITIS B: ICD-10-CM

## 2023-04-03 PROCEDURE — 76705 ECHO EXAM OF ABDOMEN: CPT | Mod: TC

## 2023-04-24 PROBLEM — Z00.00 WELLNESS EXAMINATION: Status: RESOLVED | Noted: 2022-06-08 | Resolved: 2023-04-24

## 2023-05-03 ENCOUNTER — OFFICE VISIT (OUTPATIENT)
Dept: INFECTIOUS DISEASES | Facility: CLINIC | Age: 43
End: 2023-05-03
Payer: MEDICAID

## 2023-05-03 VITALS
BODY MASS INDEX: 23.3 KG/M2 | HEART RATE: 87 BPM | WEIGHT: 111 LBS | RESPIRATION RATE: 16 BRPM | HEIGHT: 58 IN | SYSTOLIC BLOOD PRESSURE: 113 MMHG | TEMPERATURE: 98 F | DIASTOLIC BLOOD PRESSURE: 78 MMHG

## 2023-05-03 DIAGNOSIS — Z23 NEED FOR VACCINATION: ICD-10-CM

## 2023-05-03 DIAGNOSIS — B18.1 CHRONIC HEPATITIS B: Primary | ICD-10-CM

## 2023-05-03 LAB
ALBUMIN SERPL-MCNC: 4.2 G/DL (ref 3.5–5)
ALBUMIN/GLOB SERPL: 1.1 RATIO (ref 1.1–2)
ALP SERPL-CCNC: 108 UNIT/L (ref 40–150)
ALT SERPL-CCNC: 10 UNIT/L (ref 0–55)
AST SERPL-CCNC: 13 UNIT/L (ref 5–34)
BASOPHILS # BLD AUTO: 0.04 X10(3)/MCL
BASOPHILS NFR BLD AUTO: 0.6 %
BILIRUBIN DIRECT+TOT PNL SERPL-MCNC: 0.3 MG/DL
BUN SERPL-MCNC: 8.1 MG/DL (ref 7–18.7)
CALCIUM SERPL-MCNC: 9.3 MG/DL (ref 8.4–10.2)
CHLORIDE SERPL-SCNC: 106 MMOL/L (ref 98–107)
CO2 SERPL-SCNC: 23 MMOL/L (ref 22–29)
CREAT SERPL-MCNC: 0.78 MG/DL (ref 0.55–1.02)
EOSINOPHIL # BLD AUTO: 0.08 X10(3)/MCL (ref 0–0.9)
EOSINOPHIL NFR BLD AUTO: 1.3 %
ERYTHROCYTE [DISTWIDTH] IN BLOOD BY AUTOMATED COUNT: 13.4 % (ref 11.5–17)
GFR SERPLBLD CREATININE-BSD FMLA CKD-EPI: >60 MLS/MIN/1.73/M2
GLOBULIN SER-MCNC: 3.9 GM/DL (ref 2.4–3.5)
GLUCOSE SERPL-MCNC: 83 MG/DL (ref 74–100)
HCT VFR BLD AUTO: 42.3 % (ref 37–47)
HGB BLD-MCNC: 14.4 G/DL (ref 12–16)
HIV 1+2 AB+HIV1 P24 AG SERPL QL IA: NONREACTIVE
IMM GRANULOCYTES # BLD AUTO: 0.03 X10(3)/MCL (ref 0–0.04)
IMM GRANULOCYTES NFR BLD AUTO: 0.5 %
LYMPHOCYTES # BLD AUTO: 1.3 X10(3)/MCL (ref 0.6–4.6)
LYMPHOCYTES NFR BLD AUTO: 21.1 %
MCH RBC QN AUTO: 31 PG (ref 27–31)
MCHC RBC AUTO-ENTMCNC: 34 G/DL (ref 33–36)
MCV RBC AUTO: 91.2 FL (ref 80–94)
MONOCYTES # BLD AUTO: 0.17 X10(3)/MCL (ref 0.1–1.3)
MONOCYTES NFR BLD AUTO: 2.8 %
NEUTROPHILS # BLD AUTO: 4.54 X10(3)/MCL (ref 2.1–9.2)
NEUTROPHILS NFR BLD AUTO: 73.7 %
NRBC BLD AUTO-RTO: 0 %
PLATELET # BLD AUTO: 228 X10(3)/MCL (ref 130–400)
PMV BLD AUTO: 10.7 FL (ref 7.4–10.4)
POTASSIUM SERPL-SCNC: 3.8 MMOL/L (ref 3.5–5.1)
PROT SERPL-MCNC: 8.1 GM/DL (ref 6.4–8.3)
RBC # BLD AUTO: 4.64 X10(6)/MCL (ref 4.2–5.4)
SODIUM SERPL-SCNC: 139 MMOL/L (ref 136–145)
T PALLIDUM AB SER QL: NONREACTIVE
WBC # SPEC AUTO: 6.16 X10(3)/MCL (ref 4.5–11.5)

## 2023-05-03 PROCEDURE — 3074F PR MOST RECENT SYSTOLIC BLOOD PRESSURE < 130 MM HG: ICD-10-PCS | Mod: CPTII,,, | Performed by: NURSE PRACTITIONER

## 2023-05-03 PROCEDURE — 85610 PROTHROMBIN TIME: CPT | Performed by: NURSE PRACTITIONER

## 2023-05-03 PROCEDURE — 85025 COMPLETE CBC W/AUTO DIFF WBC: CPT | Performed by: NURSE PRACTITIONER

## 2023-05-03 PROCEDURE — 99214 PR OFFICE/OUTPT VISIT, EST, LEVL IV, 30-39 MIN: ICD-10-PCS | Mod: S$PBB,,, | Performed by: NURSE PRACTITIONER

## 2023-05-03 PROCEDURE — 1160F RVW MEDS BY RX/DR IN RCRD: CPT | Mod: CPTII,,, | Performed by: NURSE PRACTITIONER

## 2023-05-03 PROCEDURE — 3008F BODY MASS INDEX DOCD: CPT | Mod: CPTII,,, | Performed by: NURSE PRACTITIONER

## 2023-05-03 PROCEDURE — 87389 HIV-1 AG W/HIV-1&-2 AB AG IA: CPT | Performed by: NURSE PRACTITIONER

## 2023-05-03 PROCEDURE — 86780 TREPONEMA PALLIDUM: CPT | Performed by: NURSE PRACTITIONER

## 2023-05-03 PROCEDURE — 90632 HEPA VACCINE ADULT IM: CPT | Mod: PBBFAC

## 2023-05-03 PROCEDURE — 1159F PR MEDICATION LIST DOCUMENTED IN MEDICAL RECORD: ICD-10-PCS | Mod: CPTII,,, | Performed by: NURSE PRACTITIONER

## 2023-05-03 PROCEDURE — 3008F PR BODY MASS INDEX (BMI) DOCUMENTED: ICD-10-PCS | Mod: CPTII,,, | Performed by: NURSE PRACTITIONER

## 2023-05-03 PROCEDURE — 99214 OFFICE O/P EST MOD 30 MIN: CPT | Mod: PBBFAC | Performed by: NURSE PRACTITIONER

## 2023-05-03 PROCEDURE — 90471 IMMUNIZATION ADMIN: CPT | Mod: PBBFAC

## 2023-05-03 PROCEDURE — 1159F MED LIST DOCD IN RCRD: CPT | Mod: CPTII,,, | Performed by: NURSE PRACTITIONER

## 2023-05-03 PROCEDURE — 99214 OFFICE O/P EST MOD 30 MIN: CPT | Mod: S$PBB,,, | Performed by: NURSE PRACTITIONER

## 2023-05-03 PROCEDURE — 87517 HEPATITIS B DNA QUANT: CPT | Mod: 90 | Performed by: NURSE PRACTITIONER

## 2023-05-03 PROCEDURE — 3074F SYST BP LT 130 MM HG: CPT | Mod: CPTII,,, | Performed by: NURSE PRACTITIONER

## 2023-05-03 PROCEDURE — 1160F PR REVIEW ALL MEDS BY PRESCRIBER/CLIN PHARMACIST DOCUMENTED: ICD-10-PCS | Mod: CPTII,,, | Performed by: NURSE PRACTITIONER

## 2023-05-03 PROCEDURE — 3078F DIAST BP <80 MM HG: CPT | Mod: CPTII,,, | Performed by: NURSE PRACTITIONER

## 2023-05-03 PROCEDURE — 36415 COLL VENOUS BLD VENIPUNCTURE: CPT | Performed by: NURSE PRACTITIONER

## 2023-05-03 PROCEDURE — 3078F PR MOST RECENT DIASTOLIC BLOOD PRESSURE < 80 MM HG: ICD-10-PCS | Mod: CPTII,,, | Performed by: NURSE PRACTITIONER

## 2023-05-03 PROCEDURE — 80053 COMPREHEN METABOLIC PANEL: CPT | Performed by: NURSE PRACTITIONER

## 2023-05-03 RX ORDER — VENLAFAXINE HYDROCHLORIDE 75 MG/1
75 CAPSULE, EXTENDED RELEASE ORAL DAILY
COMMUNITY
Start: 2023-03-22

## 2023-05-03 RX ORDER — HYDROXYZINE PAMOATE 25 MG/1
CAPSULE ORAL
COMMUNITY
Start: 2023-03-22

## 2023-05-03 NOTE — PROGRESS NOTES
"Subjective     Patient ID: Raegan Paez is a 42 y.o. female.    Chief Complaint: Followup Hep B (Denies problems)    5/3/23  Raegan is a 43 yo WF here today for HBV f/u visit.  Last labs 7/22 HBV .  She denies jaundice, icterus, nausea, vomiting, dark urine, or santhosh colored stools.  She is treatment naive.  Baseline FibroScan 9/22 S1, F0-1.  Will repeat next visit. RUQ abd u/s repeated 4/23, mild steatosis noted. She is due for 2nd dose of Hep A vaccine today, amenable to same.  She tells me that her sister also has Hep B, followed by AXEL Vega NP & is prescribed treatment.  Guidelines explained to patient, voiced understanding.  She tells me that she is not eager for treatment, was just curious.  Will update labs & notify her if recommendations have changed based on updated labs. Voiced understanding & appreciation.    11/1/22  Raegan is a 43 yo WF presenting today for HBV lab results.  She is doing well today and denies any jaundice, icterus, nausea, vomiting, dark urine, or santhosh colored stools.  No family history of liver cancer.  She is amenable to Hep A vaccination dose #1 today, declines flu vaccine.  Lab results reviewed, chronic inactive infection.  Will continue to monitor.     9/16/22  Raegan is a 40 yo WF referred to [facility] for HBV by PCP LAVONNE Macedo NP.  She tells me that she was first told that she is a Hep B "carrier" during pregnancy.  She has never received treatment for this.  She states that her father & sister had hepatitis B as well.  She denies any IVDU or known direct exposures to their blood.  She is not sexually active. She does not drink alcohol or use any illicit drugs. Labs collected 7/5/22 Hep B s ag +, HBV , AST 13, ALT 14, . RUQ abd u/s 7/19/22 WNL.  She is amenable to FibroScan today.  She appreciates flu vax today as well.  Will get additional labs today for further evaluation & have her return in short interval for results.  She knows that she has a strong " family history of cancers, but not sure which ones.  Will inquire and bring this information to next visit.  Declines flu vaccine as she is not able to take vaccines. All questions answered & concerns addressed.     Review of Systems   Constitutional: Negative.    HENT: Negative.     Eyes: Negative.    Respiratory: Negative.     Cardiovascular: Negative.    Gastrointestinal: Negative.    Genitourinary: Negative.    Integumentary:  Negative.   Neurological: Negative.    Psychiatric/Behavioral: Negative.          Objective     Physical Exam  Vitals reviewed.   Constitutional:       General: She is not in acute distress.     Appearance: Normal appearance. She is not toxic-appearing.   Eyes:      General: No scleral icterus.  Cardiovascular:      Rate and Rhythm: Normal rate and regular rhythm.      Heart sounds: Normal heart sounds.   Pulmonary:      Effort: Pulmonary effort is normal. No respiratory distress.      Breath sounds: Normal breath sounds.   Abdominal:      General: Bowel sounds are normal. There is no distension.      Palpations: Abdomen is soft. There is no mass.      Tenderness: There is no abdominal tenderness.   Musculoskeletal:         General: Normal range of motion.   Skin:     General: Skin is warm and dry.   Neurological:      Mental Status: She is alert and oriented to person, place, and time.          Assessment and Plan     Problem List Items Addressed This Visit    None  Visit Diagnoses       Chronic hepatitis B    -  Primary        Chronic hepatitis B  -     Comprehensive Metabolic Panel  -     CBC Auto Differential; Future; Expected date: 05/03/2023  -     Hepatitis B DNA, Quant; Future; Expected date: 05/03/2023  -     Protime-INR; Future; Expected date: 05/03/2023  -     HIV 1/2 Ag/Ab (4th Gen); Future; Expected date: 05/03/2023  -     SYPHILIS ANTIBODY (WITH REFLEX RPR); Future; Expected date: 05/03/2023  Blood precautions, do not share a razor, needle, toothbrush, or clippers with  anyone.    Use condoms for sexual encounters.   Vaccination of all household members and close contacts strongly encouraged.  Avoid or minimize all alcohol intake.   Limit Tylenol use to 2 grams per day.   RUQ abdominal ultrasound every 6 months for liver cancer screening.   Lower fat diet, higher fiber diet.   Labs today.  Labs & U/S in 5 months as ordered.  Repeat FibroScan next visit.  RTC 6 months with Amanda.     Need for vaccination  -     Hepatitis A Vaccine (Adult) (IM)    Hep A #2 today.

## 2023-05-05 LAB — HBV DNA SERPL NAA+PROBE-ACNC: 883 IU/ML

## 2023-05-05 NOTE — PROGRESS NOTES
Reviewed labs. Hep B VL is 883 up from 441. No treatment at this time. Plan to continue to monitor. Please contact patient with results.

## 2023-05-06 ENCOUNTER — HOSPITAL ENCOUNTER (EMERGENCY)
Facility: HOSPITAL | Age: 43
Discharge: HOME | End: 2023-05-06
Attending: FAMILY MEDICINE
Payer: MEDICAID

## 2023-05-06 VITALS
RESPIRATION RATE: 20 BRPM | BODY MASS INDEX: 23.24 KG/M2 | TEMPERATURE: 98 F | SYSTOLIC BLOOD PRESSURE: 112 MMHG | HEIGHT: 58 IN | HEART RATE: 80 BPM | WEIGHT: 110.69 LBS | OXYGEN SATURATION: 100 % | DIASTOLIC BLOOD PRESSURE: 70 MMHG

## 2023-05-06 DIAGNOSIS — M79.631 RIGHT FOREARM PAIN: Primary | ICD-10-CM

## 2023-05-06 PROCEDURE — 99284 EMERGENCY DEPT VISIT MOD MDM: CPT

## 2023-05-06 RX ORDER — IBUPROFEN 800 MG/1
800 TABLET ORAL EVERY 8 HOURS PRN
Qty: 20 TABLET | Refills: 0 | Status: SHIPPED | OUTPATIENT
Start: 2023-05-06 | End: 2023-07-03

## 2023-05-06 RX ORDER — METHYLPREDNISOLONE 4 MG/1
TABLET ORAL
Qty: 1 EACH | Refills: 0 | Status: SHIPPED | OUTPATIENT
Start: 2023-05-06 | End: 2023-08-17

## 2023-05-06 NOTE — DISCHARGE INSTRUCTIONS
Take all medications as prescribed.     Drink plenty of fluids and get a lot of rest.     Use ice to area X 2 days then you may switch to heat if needed.    Return to ER for any changes or worsening of symptoms.

## 2023-05-06 NOTE — ED PROVIDER NOTES
Encounter Date: 2023       History     Chief Complaint   Patient presents with    Arm Injury     C/o pain right forearm since her dog grabbed it and twisted it. No break of skin noted.      43 YO female in ER with complaints of pain to right forearm after her dog (7 month old Armenian Stone) put its mouth on her while she was giving it a bath. States the dog did not break the skin and did not whip its head around while her arm was in the mouth. States the puppy just continued to open and close its mouth over her forearm several times while she was trying to get her arm free. Denies fever, chills, chest pain, SOB, abdominal pain, N/V/D, HA or dizziness. No other complaints.     The history is provided by the patient.   Review of patient's allergies indicates:   Allergen Reactions    Metronidazole Shortness Of Breath    Lortab [hydrocodone-acetaminophen]      Unsure    Tylenol [acetaminophen]      Unsure     Past Medical History:   Diagnosis Date    Depression     Hepatitis B     Insomnia      Past Surgical History:   Procedure Laterality Date     SECTION  Unsure    CHOLECYSTECTOMY  Unsure    HYSTERECTOMY      TUBAL LIGATION       Family History   Problem Relation Age of Onset    Hypertension Mother     Diabetes Father     Hepatitis Sister     Scoliosis Sister      Social History     Tobacco Use    Smoking status: Never     Passive exposure: Never    Smokeless tobacco: Never   Substance Use Topics    Alcohol use: Never    Drug use: Never     Review of Systems   Constitutional:  Negative for chills and fever.   HENT:  Negative for congestion and sore throat.    Respiratory:  Negative for shortness of breath.    Cardiovascular:  Negative for chest pain.   Gastrointestinal:  Negative for abdominal pain, diarrhea, nausea and vomiting.   Genitourinary:  Negative for dysuria.   Musculoskeletal:  Positive for myalgias. Negative for back pain.   Skin:  Negative for color change, rash and wound.   Neurological:   Negative for dizziness, weakness, light-headedness and headaches.   Hematological:  Does not bruise/bleed easily.   All other systems reviewed and are negative.    Physical Exam     Initial Vitals [05/06/23 1404]   BP Pulse Resp Temp SpO2   112/70 80 20 97.7 °F (36.5 °C) 100 %      MAP       --         Physical Exam    Nursing note and vitals reviewed.  Constitutional: She appears well-developed and well-nourished. She is not diaphoretic. No distress.   HENT:   Head: Normocephalic and atraumatic.   Nose: Nose normal.   Eyes: Conjunctivae are normal.   Neck: Neck supple.   Cardiovascular:  Normal rate, regular rhythm and intact distal pulses.           Pulmonary/Chest: Breath sounds normal.   Musculoskeletal:         General: Tenderness (ttp along soft tissues of right forearm, no break in skin or bruising noted) present. No edema. Normal range of motion.      Cervical back: Neck supple.     Neurological: She is alert and oriented to person, place, and time. She has normal strength.   Skin: Skin is warm and dry. Capillary refill takes less than 2 seconds.   Psychiatric: She has a normal mood and affect.       ED Course   Procedures  Labs Reviewed - No data to display       Imaging Results    None          Medications - No data to display              ED Course as of 05/06/23 1438   Sat May 06, 2023   1436 VSS, NAD, pt is non-toxic or ill appearing, offered pt xray of her forearm but she declined at this time stating she can move it and does not feel as though its broken, will treat with NSAIDs and steroid pack, pt to return if her symptoms do not improve or get worse, she verbalized understanding, treatment plan and discharge instructions including follow up discussed, pt verbalized understanding, all questions answered, pt is stable and ready for discharge  [TT]      ED Course User Index  [TT] GUY Hawkins                 Clinical Impression:   Final diagnoses:  [M79.631] Right forearm pain (Primary)        ED  Disposition Condition    Discharge Stable          ED Prescriptions       Medication Sig Dispense Start Date End Date Auth. Provider    ibuprofen (ADVIL,MOTRIN) 800 MG tablet Take 1 tablet (800 mg total) by mouth every 8 (eight) hours as needed for Pain. 20 tablet 5/6/2023 -- GUY Hawkins    methylPREDNISolone (MEDROL DOSEPACK) 4 mg tablet Take as package instructs 1 each 5/6/2023 -- GUY Hawkins          Follow-up Information       Follow up With Specialties Details Why Contact Info    PE[facility]BLES Chávez [facility] Schedule an appointment as soon as possible for a visit in 3 days  [facility] [facility] [address]  [phone]      [facility] Emergency Medicine In 3 days As needed, If symptoms worsen [facility] [facility] [id number] [phone]             GUY Hawkins  05/06/23 8340

## 2023-05-08 NOTE — PROGRESS NOTES
Phoned patient. Informed of increase in Hep B VL. Informed of no change in treatment plan at this time. Understanding verbalized. To contact clinic prn.

## 2023-05-15 ENCOUNTER — HOSPITAL ENCOUNTER (EMERGENCY)
Facility: HOSPITAL | Age: 43
Discharge: HOME | End: 2023-05-15
Attending: INTERNAL MEDICINE
Payer: MEDICAID

## 2023-05-15 VITALS
OXYGEN SATURATION: 100 % | RESPIRATION RATE: 18 BRPM | DIASTOLIC BLOOD PRESSURE: 87 MMHG | HEIGHT: 59 IN | WEIGHT: 155 LBS | TEMPERATURE: 98 F | SYSTOLIC BLOOD PRESSURE: 137 MMHG | BODY MASS INDEX: 31.25 KG/M2 | HEART RATE: 88 BPM

## 2023-05-15 DIAGNOSIS — F41.0 PANIC ATTACK: Primary | ICD-10-CM

## 2023-05-15 PROCEDURE — 99283 EMERGENCY DEPT VISIT LOW MDM: CPT

## 2023-05-16 NOTE — ED PROVIDER NOTES
Encounter Date: 5/15/2023       History     Chief Complaint   Patient presents with    Anxiety     Panic attach at home, felt better when EMS arrived.  No complaints at this time.       41 YO female in ER with complaints of having a panic attack at home earlier. Her mother gave her Vistaril at home and now she has no complaints. EMS was called to her home because she would not calm down. Denies fever, chills, chest pain, SOB, abdominal pain, N/V/D, HA or dizziness. No other complaints.     The history is provided by the patient.   Review of patient's allergies indicates:   Allergen Reactions    Metronidazole Shortness Of Breath    Lortab [hydrocodone-acetaminophen]      Unsure    Tylenol [acetaminophen]      Unsure     Past Medical History:   Diagnosis Date    Depression     Hepatitis B     Insomnia      Past Surgical History:   Procedure Laterality Date     SECTION  Unsure    CHOLECYSTECTOMY  Unsure    HYSTERECTOMY      TUBAL LIGATION  2011     Family History   Problem Relation Age of Onset    Hypertension Mother     Diabetes Father     Hepatitis Sister     Scoliosis Sister      Social History     Tobacco Use    Smoking status: Never     Passive exposure: Never    Smokeless tobacco: Never   Substance Use Topics    Alcohol use: Never    Drug use: Never     Review of Systems   Constitutional:  Negative for chills and fever.   HENT:  Negative for congestion and sore throat.    Respiratory:  Negative for shortness of breath.    Cardiovascular:  Negative for chest pain.   Gastrointestinal:  Negative for abdominal pain, diarrhea, nausea and vomiting.   Genitourinary:  Negative for dysuria.   Musculoskeletal:  Negative for back pain.   Skin:  Negative for rash.   Neurological:  Negative for dizziness, weakness, light-headedness and headaches.   Hematological:  Does not bruise/bleed easily.   Psychiatric/Behavioral:  Negative for hallucinations, self-injury and suicidal ideas. The patient is nervous/anxious. The  patient is not hyperactive.    All other systems reviewed and are negative.    Physical Exam     Initial Vitals [05/15/23 1854]   BP Pulse Resp Temp SpO2   137/87 88 18 98.2 °F (36.8 °C) 100 %      MAP       --         Physical Exam    Nursing note and vitals reviewed.  Constitutional: She appears well-developed and well-nourished. She is not diaphoretic. No distress.   HENT:   Head: Normocephalic and atraumatic.   Nose: Nose normal.   Eyes: Conjunctivae are normal.   Neck: Neck supple.   Cardiovascular:  Normal rate, regular rhythm and normal heart sounds.           Pulmonary/Chest: Breath sounds normal. No respiratory distress.   Musculoskeletal:         General: Normal range of motion.      Cervical back: Neck supple.     Neurological: She is alert and oriented to person, place, and time.   Skin: Skin is warm and dry.   Psychiatric: She has a normal mood and affect. Her speech is normal and behavior is normal. Thought content normal. Cognition and memory are normal.       ED Course   Procedures  Labs Reviewed - No data to display       Imaging Results    None          Medications - No data to display                           Clinical Impression:   Final diagnoses:  [F41.0] Panic attack (Primary)        ED Disposition Condition    Discharge Stable          ED Prescriptions    None       Follow-up Information       Follow up With Specialties Details Why Contact Info    PE[facility]BLES Chávez [facility] Schedule an appointment as soon as possible for a visit in 2 days  [address] [facility] [address]  [phone]      [facility] Emergency Medicine In 3 days As needed, If symptoms worsen [facility] [facility] [id number] [phone]             GUY Hawkins  05/15/23 1950

## 2023-05-24 ENCOUNTER — OFFICE VISIT (OUTPATIENT)
Dept: INTERNAL MEDICINE | Facility: CLINIC | Age: 43
End: 2023-05-24
Payer: MEDICAID

## 2023-05-24 DIAGNOSIS — F41.9 ANXIETY AND DEPRESSION: Primary | ICD-10-CM

## 2023-05-24 DIAGNOSIS — F32.A ANXIETY AND DEPRESSION: Primary | ICD-10-CM

## 2023-05-24 PROCEDURE — 1159F MED LIST DOCD IN RCRD: CPT | Mod: CPTII,95,, | Performed by: NURSE PRACTITIONER

## 2023-05-24 PROCEDURE — 1160F PR REVIEW ALL MEDS BY PRESCRIBER/CLIN PHARMACIST DOCUMENTED: ICD-10-PCS | Mod: CPTII,95,, | Performed by: NURSE PRACTITIONER

## 2023-05-24 PROCEDURE — 99213 OFFICE O/P EST LOW 20 MIN: CPT | Mod: 95,,, | Performed by: NURSE PRACTITIONER

## 2023-05-24 PROCEDURE — 99213 PR OFFICE/OUTPT VISIT, EST, LEVL III, 20-29 MIN: ICD-10-PCS | Mod: 95,,, | Performed by: NURSE PRACTITIONER

## 2023-05-24 PROCEDURE — 1159F PR MEDICATION LIST DOCUMENTED IN MEDICAL RECORD: ICD-10-PCS | Mod: CPTII,95,, | Performed by: NURSE PRACTITIONER

## 2023-05-24 PROCEDURE — 1160F RVW MEDS BY RX/DR IN RCRD: CPT | Mod: CPTII,95,, | Performed by: NURSE PRACTITIONER

## 2023-05-24 NOTE — ASSESSMENT & PLAN NOTE
Has been seeing Shaista Kumar for medication mgmt and therapy but wants to be referred to a counseling center[facility]. Their fax number is: [phone]  Order placed   No SI/HI

## 2023-05-24 NOTE — PROGRESS NOTES
"  PEBBLES Chávez   [facility]  [address] [facility] [address]      PATIENT NAME: Raegan Paez  : 1980  DATE: 23  MRN: [id number]      Billing Provider: PEBBLES Chávez  Level of Service:   Patient PCP Information       Provider PCP Type    PEBBLES Chávez General            Reason for Visit / Chief Complaint: Referral       History of Present Illness / Problem Focused Workflow     Raegan Paez presents to the clinic with Referral     2022: 41 y.o.  [place] [facility]     Previous PCP: Jeffery Ma NP in [place]  PmHx: Anxiety and depression, intentional overdose of acetaminophen with subsequent coma/temporary tracheostomy (resolved)  SHx: see below  FHx: see below. Reports h/o cancer on both sides of the family but cannot ascertain any specific types  Complaints today: Establish care. Patient lives with friends who come to clinic here, so she wanted to switch services to here as well. She has a h/o anxiety, depression and insomnia. She's prescribed Klonopin TID per last PCP for anxiety and "sleep." She was in a coma about 10-11 years ago after her "ex- made me overdose on Norco and Tylenol." She was pregnant with twins at the time and miscarried. Also had a tracheostomy which was removed. [facility] [place]. [facility] hasn't taken Tylenol since that time. She follows [facility] for women's needs. [facility] last appt a few months ago. Reportedly had a negative pap smear. No acute concerns.    2022: Raegan is presenting to review labs. Lab review revealed: CBC unremarkable, glucose 103, A1c 4.9, FLP WNL, TSH WNL, UA with some WBCs and occ bacteria (urine culture no growth; patient denies urinary concerns), Hepatitis B surface antigen reactive. "The presence of hepatitis B surface antigen indicates acute or chronic hepatitis B " "infection." Today, patient reports that she's always been told "I was a carrier." States father is a carrier and sister has hepatitis B. She has no abdominal pain, fatigue, appetite changes, abd swelling, jaundice, dark urine, etc. No other concerns.    Note, patient following psychiatry outside of this facility.     1/18/23: Raegan is presenting for routine f/u. Accompanied by mother-in-law, Callie. States not sure why she's here today. She did visit the ED in November with L ankle pain after twisting ankle. Was told she sprained ankle and was referred to Ortho. By the time Ortho call, pain had resolved. She continues to follow ID for chronic Hep B which is currently being monitored. She had a negative mammogram in September. Today, her only concern is ongoing dry cough, nasal congestion, and throat irritation x 2 weeks. Visited a local UC at start of sx and was given benzonatate only. This is not effective. She does have sick contacts.     3/6/23: Patient presenting with concerns about a medication side effect. [facility] relates [facility] [place], [place], across from the hospital, for a routine pap.  was diagnosed with a vaginal infection and prescribed oral metronidazole 2/9/23.  took first dose, then experienced wheezing and trouble breathing shortly after. She called EMS and was transported to the ED. States, "They did a EKG and everything was fine." She was prescribed Metrogel which shows filled 2/24/23 and states "I've been doing fine with it." No other concerns.    5/24/23: Patient presenting with request for a referral to a counseling center in [place]. She has a history of anxiety and panic attacks and notes more panic attacks over the recent weeks. She's been following [facility] for anxiety and depression, as well as, medication management, but states mother-in-law sees a counselor at [facility] and she would like to be referred for the same. She denies CP or SOB. " No SI/HI.      Review of Systems     Review of Systems   Constitutional:  Negative for activity change and unexpected weight change.   HENT:  Negative for hearing loss, rhinorrhea and trouble swallowing.    Eyes:  Negative for discharge and visual disturbance.   Respiratory:  Negative for chest tightness and wheezing.    Cardiovascular:  Negative for chest pain and palpitations.   Gastrointestinal:  Negative for blood in stool, constipation, diarrhea and vomiting.   Endocrine: Negative for polydipsia and polyuria.   Genitourinary:  Negative for difficulty urinating, dysuria, hematuria and menstrual problem.   Musculoskeletal:  Negative for arthralgias, joint swelling and neck pain.   Neurological:  Negative for weakness and headaches.   Psychiatric/Behavioral:  Positive for confusion and dysphoric mood. The patient is nervous/anxious.    All other systems reviewed and are negative.    Medical / Social / Family History     Past Medical History:   Diagnosis Date    Depression     Hepatitis B     Insomnia        Past Surgical History:   Procedure Laterality Date     SECTION  Unsure    CHOLECYSTECTOMY  Unsure    HYSTERECTOMY      TUBAL LIGATION         Social History  Ms.  reports that she has never smoked. She has never been exposed to tobacco smoke. She has never used smokeless tobacco. She reports that she does not drink alcohol and does not use drugs.    Family History  Ms.'s family history includes Diabetes in her father; Hepatitis in her sister; Hypertension in her mother; Scoliosis in her sister.    Medications and Allergies     Medications  Medication List with Changes/Refills   Current Medications    CLONAZEPAM (KLONOPIN) 0.5 MG TABLET    Take 0.5 mg by mouth 3 (three) times daily.    FLUOXETINE 20 MG CAPSULE    Take 20 mg by mouth once daily.    FLUOXETINE 40 MG CAPSULE    Take 40 mg by mouth once daily.    HYDROXYZINE PAMOATE (VISTARIL) 25 MG CAP    TAKE 1 CAPSULE BY MOUTH TWICE DAILY AS NEEDED  FOR ANXIETY    IBUPROFEN (ADVIL,MOTRIN) 800 MG TABLET    Take 1 tablet (800 mg total) by mouth every 8 (eight) hours as needed for Pain.    METHYLPREDNISOLONE (MEDROL DOSEPACK) 4 MG TABLET    Take as package instructs    METRONIDAZOLE (METROGEL) 0.75 % (37.5MG/5 GRAM) VAGINAL GEL    Place 1 applicator vaginally every evening.    ONDANSETRON (ZOFRAN-ODT) 8 MG TBDL    TAKE 1 TABLET DISSOLVED IN MOUTH EVERY 8 HOURS    TRAZODONE (DESYREL) 50 MG TABLET    Take 50 mg by mouth nightly.    TRIAMCINOLONE ACETONIDE 0.1% (KENALOG) 0.1 % CREAM    Apply topically 2 (two) times daily.    VENLAFAXINE (EFFEXOR-XR) 37.5 MG 24 HR CAPSULE    Take 37.5 mg by mouth.    VENLAFAXINE (EFFEXOR-XR) 75 MG 24 HR CAPSULE    Take 75 mg by mouth.       Allergies  Review of patient's allergies indicates:   Allergen Reactions    Metronidazole Shortness Of Breath    Lortab [hydrocodone-acetaminophen]      Unsure    Tylenol [acetaminophen]      Unsure       Physical Examination     There were no vitals filed for this visit.    Physical Exam  Constitutional:       Appearance: Normal appearance.   HENT:      Head: Normocephalic and atraumatic.   Pulmonary:      Effort: Pulmonary effort is normal.   Musculoskeletal:      Cervical back: Normal range of motion.   Neurological:      General: No focal deficit present.      Mental Status: She is alert and oriented to person, place, and time.   Psychiatric:         Mood and Affect: Mood normal.         Behavior: Behavior normal.         Thought Content: Thought content normal.         Judgment: Judgment normal.         Results     Lab Results   Component Value Date    WBC 6.16 05/03/2023    RBC 4.64 05/03/2023    HGB 14.4 05/03/2023    HCT 42.3 05/03/2023    MCV 91.2 05/03/2023    MCH 31.0 05/03/2023    MCHC 34.0 05/03/2023    RDW 13.4 05/03/2023     05/03/2023    MPV 10.7 (H) 05/03/2023     CMP  Sodium Level   Date Value Ref Range Status   05/03/2023 139 136 - 145 mmol/L Final     Potassium Level    Date Value Ref Range Status   05/03/2023 3.8 3.5 - 5.1 mmol/L Final     Carbon Dioxide   Date Value Ref Range Status   05/03/2023 23 22 - 29 mmol/L Final     Blood Urea Nitrogen   Date Value Ref Range Status   05/03/2023 8.1 7.0 - 18.7 mg/dL Final     Creatinine   Date Value Ref Range Status   05/03/2023 0.78 0.55 - 1.02 mg/dL Final     Calcium Level Total   Date Value Ref Range Status   05/03/2023 9.3 8.4 - 10.2 mg/dL Final     Albumin Level   Date Value Ref Range Status   05/03/2023 4.2 3.5 - 5.0 g/dL Final     Bilirubin Total   Date Value Ref Range Status   05/03/2023 0.3 <=1.5 mg/dL Final     Alkaline Phosphatase   Date Value Ref Range Status   05/03/2023 108 40 - 150 unit/L Final     Aspartate Aminotransferase   Date Value Ref Range Status   05/03/2023 13 5 - 34 unit/L Final     Alanine Aminotransferase   Date Value Ref Range Status   05/03/2023 10 0 - 55 unit/L Final     Estimated GFR-Non    Date Value Ref Range Status   07/05/2022 >60 mls/min/1.73/m2 Final     Lab Results   Component Value Date    CHOL 189 07/05/2022     Lab Results   Component Value Date    HDL 52 07/05/2022     No results found for: LDLCALC  Lab Results   Component Value Date    TRIG 120 07/05/2022     No results found for: CHOLHDL  Lab Results   Component Value Date    TSH 3.4065 07/05/2022     Lab Results   Component Value Date    PROTEINUA Negative 07/05/2022    LEUKOCYTESUR 25 (A) 07/05/2022           Assessment and Plan (including Health Maintenance)     Plan:         Health Maintenance Due   Topic Date Due    COVID-19 Vaccine (1) Never done    Pneumococcal Vaccines (Age 0-64) (1 - PCV) Never done    TETANUS VACCINE  Never done       Problem List Items Addressed This Visit          Psychiatric    Anxiety and depression - Primary    Current Assessment & Plan     Has been seeing [facility] Kumar for medication mgmt and therapy but wants to be referred to a counseling center, [facility]. Their fax  number is: [phone]  Order placed   No SI/HI           Relevant Orders    Ambulatory referral/consult to Psychiatry       Health Maintenance Topics with due status: Not Due       Topic Last Completion Date    Hemoglobin A1c (Diabetic Prevention Screening) 07/05/2022    Mammogram 09/08/2022    Influenza Vaccine Not Due       Future Appointments   Date Time Provider Department Center   7/18/2023  1:00 PM [facility]   11/6/2023  9:50 AM [facility]    11/9/2023 10:10 AM Amanda Barrow, APRN [facility]      The patient location is: patient in the backseat of a car. She is not driving. Consents to visit with mother-in-law present  The chief complaint leading to consultation is: Referral    Visit type: audiovisual    Face to Face time with patient: 7  15 minutes of total time spent on the encounter, which includes face to face time and non-face to face time preparing to see the patient (eg, review of tests), Obtaining and/or reviewing separately obtained history, Documenting clinical information in the electronic or other health record, Independently interpreting results (not separately reported) and communicating results to the patient/family/caregiver, or Care coordination (not separately reported).     Each patient to whom he or she provides medical services by telemedicine is:  (1) informed of the relationship between the physician and patient and the respective role of any other health care provider with respect to management of the patient; and (2) notified that he or she may decline to receive medical services by telemedicine and may withdraw from such care at any time.      Signature:  PEBBLES Chávez  [facility]  [address] [address]    Date of encounter: 5/24/23

## 2023-07-02 ENCOUNTER — HOSPITAL ENCOUNTER (EMERGENCY)
Facility: HOSPITAL | Age: 43
Discharge: LEFT BEFORE DISCHARGE | End: 2023-07-02
Attending: EMERGENCY MEDICINE
Payer: MEDICAID

## 2023-07-02 VITALS
DIASTOLIC BLOOD PRESSURE: 90 MMHG | SYSTOLIC BLOOD PRESSURE: 120 MMHG | WEIGHT: 108 LBS | RESPIRATION RATE: 18 BRPM | BODY MASS INDEX: 21.77 KG/M2 | HEART RATE: 79 BPM | TEMPERATURE: 98 F | OXYGEN SATURATION: 99 % | HEIGHT: 59 IN

## 2023-07-02 DIAGNOSIS — R10.9 ABDOMINAL PAIN: ICD-10-CM

## 2023-07-02 LAB
ANION GAP SERPL CALC-SCNC: 10 MEQ/L
BASOPHILS # BLD AUTO: 0.04 X10(3)/MCL
BASOPHILS NFR BLD AUTO: 0.4 %
BUN SERPL-MCNC: 13.3 MG/DL (ref 7–18.7)
CALCIUM SERPL-MCNC: 9.6 MG/DL (ref 8.4–10.2)
CHLORIDE SERPL-SCNC: 107 MMOL/L (ref 98–107)
CO2 SERPL-SCNC: 17 MMOL/L (ref 22–29)
CREAT SERPL-MCNC: 0.83 MG/DL (ref 0.55–1.02)
CREAT/UREA NIT SERPL: 16
EOSINOPHIL # BLD AUTO: 0.26 X10(3)/MCL (ref 0–0.9)
EOSINOPHIL NFR BLD AUTO: 2.7 %
ERYTHROCYTE [DISTWIDTH] IN BLOOD BY AUTOMATED COUNT: 12.7 % (ref 11.5–17)
GFR SERPLBLD CREATININE-BSD FMLA CKD-EPI: >60 MLS/MIN/1.73/M2
GLUCOSE SERPL-MCNC: 93 MG/DL (ref 74–100)
HCT VFR BLD AUTO: 41.6 % (ref 37–47)
HGB BLD-MCNC: 13.9 G/DL (ref 12–16)
IMM GRANULOCYTES # BLD AUTO: 0.02 X10(3)/MCL (ref 0–0.04)
IMM GRANULOCYTES NFR BLD AUTO: 0.2 %
LYMPHOCYTES # BLD AUTO: 1.68 X10(3)/MCL (ref 0.6–4.6)
LYMPHOCYTES NFR BLD AUTO: 17.5 %
MCH RBC QN AUTO: 30.4 PG (ref 27–31)
MCHC RBC AUTO-ENTMCNC: 33.4 G/DL (ref 33–36)
MCV RBC AUTO: 91 FL (ref 80–94)
MONOCYTES # BLD AUTO: 0.43 X10(3)/MCL (ref 0.1–1.3)
MONOCYTES NFR BLD AUTO: 4.5 %
NEUTROPHILS # BLD AUTO: 7.18 X10(3)/MCL (ref 2.1–9.2)
NEUTROPHILS NFR BLD AUTO: 74.7 %
NRBC BLD AUTO-RTO: 0 %
PLATELET # BLD AUTO: 198 X10(3)/MCL (ref 130–400)
PMV BLD AUTO: 10.8 FL (ref 7.4–10.4)
POTASSIUM SERPL-SCNC: 4.2 MMOL/L (ref 3.5–5.1)
RBC # BLD AUTO: 4.57 X10(6)/MCL (ref 4.2–5.4)
SODIUM SERPL-SCNC: 134 MMOL/L (ref 136–145)
WBC # SPEC AUTO: 9.61 X10(3)/MCL (ref 4.5–11.5)

## 2023-07-02 PROCEDURE — 80048 BASIC METABOLIC PNL TOTAL CA: CPT | Performed by: EMERGENCY MEDICINE

## 2023-07-02 PROCEDURE — 99285 EMERGENCY DEPT VISIT HI MDM: CPT | Mod: 25

## 2023-07-02 PROCEDURE — 25500020 PHARM REV CODE 255

## 2023-07-02 PROCEDURE — 85025 COMPLETE CBC W/AUTO DIFF WBC: CPT | Performed by: EMERGENCY MEDICINE

## 2023-07-02 RX ADMIN — IOHEXOL 100 ML: 350 INJECTION, SOLUTION INTRAVENOUS at 09:07

## 2023-07-02 NOTE — LETTER
Patient: ELMO Paez  YOB: 1980  Date: 7/2/2023 Time: 10:38 PM  Location: [facility]    Leaving the Hospital Against Medical Advice    Chart #:[id number]    This will certify that I, the undersigned,    ______________________________________________________________________    A patient in the above named medical center, having requested discharge and removal from the medical center against the advice of my attending physician(s), hereby release [facility], its physicians, officers and employees, severally and individually, from any and all liability of any nature whatsoever for any injury or harm or complication of any kind that may result directly or indirectly, by reason of my terminating my stay as a patient at [facility] and my departure from [facility], and hereby waive any and all rights of action I may now have or later acquire as a result of my voluntary departure from [facility] and the termination of my stay as a patient therein.    This release is made with the full knowledge of the danger that may result from the action which I am taking.      Date:_______________________                         ___________________________                                                                                    Patient/Legal Representative    Witness:        ____________________________                          ___________________________  Nurse                                                                        Physician

## 2023-07-03 ENCOUNTER — HOSPITAL ENCOUNTER (EMERGENCY)
Facility: HOSPITAL | Age: 43
Discharge: HOME | End: 2023-07-03
Attending: INTERNAL MEDICINE
Payer: MEDICAID

## 2023-07-03 ENCOUNTER — PATIENT MESSAGE (OUTPATIENT)
Dept: INTERNAL MEDICINE | Facility: CLINIC | Age: 43
End: 2023-07-03
Payer: MEDICAID

## 2023-07-03 VITALS
HEART RATE: 78 BPM | WEIGHT: 104.94 LBS | RESPIRATION RATE: 18 BRPM | TEMPERATURE: 99 F | OXYGEN SATURATION: 99 % | DIASTOLIC BLOOD PRESSURE: 76 MMHG | HEIGHT: 59 IN | BODY MASS INDEX: 21.16 KG/M2 | SYSTOLIC BLOOD PRESSURE: 121 MMHG

## 2023-07-03 DIAGNOSIS — R10.12 LEFT UPPER QUADRANT ABDOMINAL PAIN: ICD-10-CM

## 2023-07-03 DIAGNOSIS — N20.0 LEFT RENAL STONE: Primary | ICD-10-CM

## 2023-07-03 PROCEDURE — 96372 THER/PROPH/DIAG INJ SC/IM: CPT | Performed by: NURSE PRACTITIONER

## 2023-07-03 PROCEDURE — 63600175 PHARM REV CODE 636 W HCPCS: Performed by: NURSE PRACTITIONER

## 2023-07-03 PROCEDURE — 99284 EMERGENCY DEPT VISIT MOD MDM: CPT

## 2023-07-03 RX ORDER — TAMSULOSIN HYDROCHLORIDE 0.4 MG/1
0.4 CAPSULE ORAL DAILY
Qty: 10 CAPSULE | Refills: 0 | Status: SHIPPED | OUTPATIENT
Start: 2023-07-03 | End: 2023-08-17

## 2023-07-03 RX ORDER — ONDANSETRON 4 MG/1
4 TABLET, ORALLY DISINTEGRATING ORAL EVERY 8 HOURS PRN
Qty: 9 TABLET | Refills: 0 | Status: SHIPPED | OUTPATIENT
Start: 2023-07-03 | End: 2023-07-06

## 2023-07-03 RX ORDER — ONDANSETRON 2 MG/ML
4 INJECTION INTRAMUSCULAR; INTRAVENOUS ONCE
Status: COMPLETED | OUTPATIENT
Start: 2023-07-03 | End: 2023-07-03

## 2023-07-03 RX ORDER — KETOROLAC TROMETHAMINE 10 MG/1
10 TABLET, FILM COATED ORAL EVERY 6 HOURS PRN
Qty: 20 TABLET | Refills: 0 | Status: SHIPPED | OUTPATIENT
Start: 2023-07-03

## 2023-07-03 RX ORDER — KETOROLAC TROMETHAMINE 30 MG/ML
30 INJECTION, SOLUTION INTRAMUSCULAR; INTRAVENOUS
Status: COMPLETED | OUTPATIENT
Start: 2023-07-03 | End: 2023-07-03

## 2023-07-03 RX ORDER — CIPROFLOXACIN 500 MG/1
500 TABLET ORAL 2 TIMES DAILY
Qty: 14 TABLET | Refills: 0 | Status: SHIPPED | OUTPATIENT
Start: 2023-07-03 | End: 2023-07-10

## 2023-07-03 RX ADMIN — KETOROLAC TROMETHAMINE 30 MG: 30 INJECTION, SOLUTION INTRAMUSCULAR; INTRAVENOUS at 01:07

## 2023-07-03 RX ADMIN — ONDANSETRON 4 MG: 2 INJECTION INTRAMUSCULAR; INTRAVENOUS at 01:07

## 2023-07-03 NOTE — ED PROVIDER NOTES
"ED PROVIDER NOTE  2023    CHIEF COMPLAINT:   Chief Complaint   Patient presents with    Abdominal Pain     Pt reports trouble passing gas and feeling bloated (x)2-3 days. Also states left-lower abdominal pain with "small" bowel movement earlier today. Madelyn amor       HISTORY OF PRESENT ILLNESS:   Raegan Paez is a 42 y.o. female who presents with chief complaint Abdominal pain.  Onset was 2 days ago whenever she began having persistent sharp left lower quadrant abdominal pain that she states is temporarily improved if she belches, nothing specifically makes it worse.  Associated symptoms of constipation stating her last normal bowel movement was 3 days ago, has passed some stool but not much and is still passing gas.  Denies history of bowel obstruction.  No nausea or vomiting.    The history is provided by the patient.       REVIEW OF SYSTEMS: as noted in the HPI.  NURSING NOTES REVIEWED      PAST MEDICAL/SURGICAL HISTORY:   Past Medical History:   Diagnosis Date    Depression     Hepatitis B     Insomnia       Past Surgical History:   Procedure Laterality Date     SECTION  Unsure    CHOLECYSTECTOMY  Unsure    HYSTERECTOMY      TUBAL LIGATION         FAMILY HISTORY:   Family History   Problem Relation Age of Onset    Hypertension Mother     Diabetes Father     Hepatitis Sister     Scoliosis Sister        SOCIAL HISTORY:   Social History     Tobacco Use    Smoking status: Never     Passive exposure: Never    Smokeless tobacco: Never   Substance Use Topics    Alcohol use: Never    Drug use: Never       ALLERGIES:   Review of patient's allergies indicates:   Allergen Reactions    Metronidazole Shortness Of Breath    Lortab [hydrocodone-acetaminophen]      Unsure    Tylenol [acetaminophen]      Unsure       PHYSICAL EXAM:  Initial Vitals [23]   BP Pulse Resp Temp SpO2   115/79 90 18 98.2 °F (36.8 °C) 97 %      MAP       --         Physical Exam    Nursing note and vitals " reviewed.  Constitutional: She appears well-developed and well-nourished.   HENT:   Head: Normocephalic and atraumatic.   Mouth/Throat: Uvula is midline and mucous membranes are normal.   Eyes: EOM are normal. Pupils are equal, round, and reactive to light.   Neck: Trachea normal. Neck supple.   Cardiovascular:  Normal rate, regular rhythm, intact distal pulses and normal pulses.           Pulmonary/Chest: Effort normal and breath sounds normal.   Abdominal: Abdomen is soft. Bowel sounds are increased. There is abdominal tenderness in the left lower quadrant. There is no rebound and no guarding.   Musculoskeletal:         General: Normal range of motion.      Cervical back: Neck supple.     Neurological: She is alert and oriented to person, place, and time. GCS eye subscore is 4. GCS verbal subscore is 5. GCS motor subscore is 6.   Skin: Skin is warm and dry.   Psychiatric: She has a normal mood and affect. Her speech is normal. Thought content normal.       RESULTS:  Labs Reviewed   BASIC METABOLIC PANEL - Abnormal; Notable for the following components:       Result Value    Sodium Level 134 (*)     Carbon Dioxide 17 (*)     All other components within normal limits   CBC WITH DIFFERENTIAL - Abnormal; Notable for the following components:    MPV 10.8 (*)     All other components within normal limits   CBC W/ AUTO DIFFERENTIAL    Narrative:     The following orders were created for panel order CBC auto differential.  Procedure                               Abnormality         Status                     ---------                               -----------         ------                     CBC with Differential[[id number]]        Abnormal            Final result                 Please view results for these tests on the individual orders.     Imaging Results              CT Abdomen Pelvis With Contrast (Final result)  Result time 07/03/23 10:24:02      Final result by Kofi Harmon MD (07/03/23 10:24:02)                    Impression:    Impression:    1. The uterus is not identified consistent with hysterectomy. The uterine stump appears bulky and shows low attenuation on series 2 image 108 such that any underlying lesion is not excluded. Correlate clinically as regards additional evaluation and follow-up.    2. There are multiple fluid filled small bowel loops which are mildly distended. There is mild fluid-filled distension of the cecum, ascending colon and transverse colon till the level of junction of splenic flexure and upper descending colon which shows focal circumferential wall thickening and mucosal enhancement. There is surrounding localized fat stranding in the left upper quadrant. These findings are suggestive pronounced enterocolitis. Correlate with clinical and laboratory findings as regards infectious versus inflammatory etiology.    3. Details and other findings as discussed above.    I concur with the preliminary report above.      Electronically signed by: Kofi Harmon  Date:    07/03/2023  Time:    10:24               Narrative:    EXAMINATION:  CT ABDOMEN PELVIS WITH CONTRAST    Technique: CT of the abdomen and pelvis was performed with axial images as well as sagittal and coronal reconstruction images with intravenous contrast.    Comparison: None available.    Clinical History: Bowel obstruction suspected.    Dosage Information: Automated Exposure Control was utilized 224.81 mGy.cm.    Findings:    Lines and Tubes: None.    Thorax:    Lungs: The visualized lung bases appear unremarkable.    Pleura: No effusions or thickening are seen. No pneumothorax is seen in the visualized lung bases.    Heart: The heart size is within normal limits.    Abdomen:    Abdominal Wall: No abdominal wall pathology is seen.    Liver: The liver appears unremarkable.    Biliary System: No intrahepatic or extrahepatic biliary duct dilatation is seen.    Gallbladder: Surgical clips are seen in the gallbladder fossa  which may reflect prior cholecystectomy.    Pancreas: The pancreas appears unremarkable.    Spleen: The spleen appears unremarkable.    Adrenals: The adrenal glands appear unremarkable.    Kidneys: The right kidney appears unremarkable with no stones cysts masses or hydronephrosis. A single stone measuring 7.4 mm is seen on Image 38, Series 2 in the upper pole of the left kidney. The left kidney otherwise appears unremarkable with no cysts masses or hydronephrosis identified.    Aorta: The abdominal aorta appears unremarkable.    IVC: Unremarkable.    Bowel: There are multiple fluid filled small bowel loops which are mildly distended. There is mild fluid-filled distension of the cecum, ascending colon and transverse colon till the level of junction of splenic flexure and upper descending colon which shows focal circumferential wall thickening and mucosal enhancement. There is surrounding localized fat stranding in the left upper quadrant.    Esophagus: The visualized esophagus appears unremarkable.    Stomach: The stomach appears unremarkable.    Duodenum: Unremarkable appearing duodenum.    Colon: Nondistended.    Appendix: The appendix is not identified but no inflammatory changes are seen in the right lower quadrant to suggest appendicitis.    Peritoneum: No intraperitoneal free air or ascites is seen.    Pelvis:    Bladder: The bladder appears unremarkable.    Female:    Uterus: The uterus is not identified consistent with hysterectomy. The uterine stump appears bulky and shows low attenuation on series 2 image 108 such that any underlying lesion is not excluded.    Ovaries: No adnexal masses are seen.    Bony structures:    Dorsal Spine: There is levoconvex scoliosis of the lumbar spine.    Bony Pelvis: The visualized bony structures of the pelvis appear unremarkable.                        Preliminary result by Kofi Harmon MD (07/03/23 02:35:06)                   Narrative:    START OF  REPORT:  Technique: CT of the abdomen and pelvis was performed with axial images as well as sagittal and coronal reconstruction images with intravenous contrast.    Comparison: None available.    Clinical History: Bowel obstruction suspected.    Dosage Information: Automated Exposure Control was utilized 224.81 mGy.cm.    Findings:  Lines and Tubes: None.  Thorax:  Lungs: The visualized lung bases appear unremarkable.  Pleura: No effusions or thickening are seen. No pneumothorax is seen in the visualized lung bases.  Heart: The heart size is within normal limits.  Abdomen:  Abdominal Wall: No abdominal wall pathology is seen.  Liver: The liver appears unremarkable.  Biliary System: No intrahepatic or extrahepatic biliary duct dilatation is seen.  Gallbladder: Surgical clips are seen in the gallbladder fossa which may reflect prior cholecystectomy.  Pancreas: The pancreas appears unremarkable.  Spleen: The spleen appears unremarkable.  Adrenals: The adrenal glands appear unremarkable.  Kidneys: The right kidney appears unremarkable with no stones cysts masses or hydronephrosis. A single stone measuring 7.4 mm is seen on Image 38, Series 2 in the upper pole of the left kidney. The left kidney otherwise appears unremarkable with no cysts masses or hydronephrosis identified.  Aorta: The abdominal aorta appears unremarkable.  IVC: Unremarkable.  Bowel: There are multiple fluid filled small bowel loops which are mildly distended. There is mild fluid-filled distension of the cecum, ascending colon and transverse colon till the level of junction of splenic flexure and upper descending colon which shows focal circumferential wall thickening and mucosal enhancement. There is surrounding localized fat stranding in the left upper quadrant.  Esophagus: The visualized esophagus appears unremarkable.  Stomach: The stomach appears unremarkable.  Duodenum: Unremarkable appearing duodenum.  Colon: Nondistended.  Appendix: The  appendix is not identified but no inflammatory changes are seen in the right lower quadrant to suggest appendicitis.  Peritoneum: No intraperitoneal free air or ascites is seen.    Pelvis:  Bladder: The bladder appears unremarkable.  Female:  Uterus: The uterus is not identified consistent with hysterectomy. The uterine stump appears bulky and shows low attenuation on series 2 image 108 such that any underlying lesion is not excluded.  Ovaries: No adnexal masses are seen.    Bony structures:  Dorsal Spine: There is levoconvex scoliosis of the lumbar spine.  Bony Pelvis: The visualized bony structures of the pelvis appear unremarkable.      Impression:  1. The uterus is not identified consistent with hysterectomy. The uterine stump appears bulky and shows low attenuation on series 2 image 108 such that any underlying lesion is not excluded. Correlate clinically as regards additional evaluation and follow-up.  2. There are multiple fluid filled small bowel loops which are mildly distended. There is mild fluid-filled distension of the cecum, ascending colon and transverse colon till the level of junction of splenic flexure and upper descending colon which shows focal circumferential wall thickening and mucosal enhancement. There is surrounding localized fat stranding in the left upper quadrant. These findings are suggestive pronounced enterocolitis. Correlate with clinical and laboratory findings as regards infectious versus inflammatory etiology.  3. Details and other findings as discussed above.                                         X-Ray Abdomen Flat And Erect (Final result)  Result time 07/03/23 12:45:15      Final result by Luciano Paez MD (07/03/23 12:45:15)                   Impression:      Nonobstructive bowel gas pattern.  Possible 6 mm left renal calcification.      Electronically signed by: Luciano Paez  Date:    07/03/2023  Time:    12:45               Narrative:    EXAMINATION:  XR ABDOMEN FLAT AND  ERECT    CLINICAL HISTORY:  Unspecified abdominal pain    TECHNIQUE:  Flat and erect AP views of the abdomen.    COMPARISON:  None    FINDINGS:  Clear lung bases.  No dilated bowel, significant air-fluid levels or definitive findings for pneumoperitoneum.  Upper abdominal and right pelvic surgical clips.  Small volume stool.  6 mm calcification overlying the left kidney.                        Wet Read by Trevor Howell DO (07/02/23 20:23:44, [facility], Emergency Medicine)    No obvious obstructive bowel gas pattern                                    PROCEDURES:  Procedures    ECG:       ED COURSE AND MEDICAL DECISION MAKING:  Medications   iohexoL (OMNIPAQUE 350) 350 mg iodine/mL injection (100 mLs Intravenous Given 7/2/23 3937)           Medical Decision Making  42-year-old female who presents with sharp left lower quadrant abdominal pain over the past 2 days that she states feels better if she belches or passes gas associated with constipation.  She does have some mild left lower quadrant tenderness on exam, no rebound or guarding.  CBC shows no leukocytosis or leukopenia or anemia.  BMP grossly unremarkable.  Abdominal x-ray shows nonobstructive bowel gas pattern.  CT abdomen and pelvis was obtained to evaluate for any acute pathology such as obstruction or inflammation.  Unfortunately patient states that she can no longer wait for results and has decided to leave our facility against medical advice. I have assessed the patient's ability to make an informed decision and it is my opinion at this time that the patient has the medical decision-making capacity to comprehend information regarding current medical condition and appreciates the impact of the disease or condition and the consequences of various options for treatment, including foregoing treatment. The patient possesses the ability to evaluate all treatment options, compare the risks and benefits of each option, communicate  choice in a consistent manner over time, and is able to make rational choices. I have explained to the patient further testing, treatment, and evaluation I would like to perform during the current emergency department visit as well as any possible alternatives that could be accomplished in a timely manner. I have outlined the possible risks of foregoing any or all of these interventions and the patient understands and acknowledges that the decision to leave may result in undesirable consequences such as death, permanent disability, and/or loss of current lifestyle. Even though leaving AMA is not ideal, I have instructed the patient to follow any discharge instructions given, take any medications prescribed, and resume care as soon as possible with another provider. Additionally, we clearly stated that the patient is welcome to return at any time to continue care at our facility.    Problems Addressed:  Abdominal pain: complicated acute illness or injury     Details: Differential Diagnoses include: Appendicitis, Colitis, Irritable Bowel Syndrome, Inflammatory Bowel Disease, Diverticulitis, Intestinal Obstruction, Mesenteric Ischemia, Nephrolithiasis, Hernia.    Amount and/or Complexity of Data Reviewed  Labs: ordered. Decision-making details documented in ED Course.  Radiology: ordered and independent interpretation performed. Decision-making details documented in ED Course.        CLINICAL IMPRESSION:  1. Abdominal pain        DISPOSITION:   ED Disposition Condition    SAHILTASHA                     Trevor Howell DO  07/04/23 0630

## 2023-07-03 NOTE — DISCHARGE INSTRUCTIONS
Follow up with your primary care physician in 3-5 days for follow up evaluation.  Follow up with Urology Clinic as discussed.  Increase oral fluids.  Take pain medication as prescribed for no more than 7 days.

## 2023-07-03 NOTE — ED PROVIDER NOTES
Encounter Date: 7/3/2023       History     Chief Complaint   Patient presents with    Abdominal Pain     Left sided abd pain since Friday. Dx with kidney stone here last night. Here due to persistent pain.     Pt is a 42 y.o. female who presents to the [facility] ED for evaluation for Lt flank pain. Pt initially seen for issue last night but left prior to completion of care. Admits that she only returned because the [facility] system showed that she had a kidney stone. Denies chest pain, SOB, weakness, dizziness, fever, pain with urination, blood in her urine, diarrhea, or inability to pass flatus. Hx of depression. Denies SI, HI, auditory hallucinations, or visual hallucinations.     Review of patient's allergies indicates:   Allergen Reactions    Metronidazole Shortness Of Breath    Lortab [hydrocodone-acetaminophen]      Unsure    Tylenol [acetaminophen]      Unsure     Past Medical History:   Diagnosis Date    Depression     Hepatitis B     Insomnia      Past Surgical History:   Procedure Laterality Date     SECTION  Unsure    CHOLECYSTECTOMY  Unsure    HYSTERECTOMY      TUBAL LIGATION       Family History   Problem Relation Age of Onset    Hypertension Mother     Diabetes Father     Hepatitis Sister     Scoliosis Sister      Social History     Tobacco Use    Smoking status: Never     Passive exposure: Never    Smokeless tobacco: Never   Substance Use Topics    Alcohol use: Never    Drug use: Never     Review of Systems   Constitutional:  Negative for chills, diaphoresis, fatigue and fever.   HENT:  Negative for facial swelling, rhinorrhea, sinus pressure, sinus pain, sore throat and trouble swallowing.    Respiratory:  Negative for cough, chest tightness, shortness of breath and wheezing.    Cardiovascular:  Negative for chest pain, palpitations and leg swelling.   Gastrointestinal:  Negative for abdominal pain, diarrhea, nausea and vomiting.   Genitourinary:  Positive for flank pain. Negative for dysuria,  frequency, hematuria and urgency.   Musculoskeletal:  Negative for arthralgias, back pain, joint swelling and myalgias.   Skin:  Negative for color change and rash.   Neurological:  Negative for dizziness, syncope, weakness and light-headedness.   Hematological:  Does not bruise/bleed easily.   All other systems reviewed and are negative.    Physical Exam     Initial Vitals [07/03/23 1224]   BP Pulse Resp Temp SpO2   111/85 84 18 98.6 °F (37 °C) 98 %      MAP       --         Physical Exam    Nursing note and vitals reviewed.  Constitutional: She appears well-developed and well-nourished.   HENT:   Head: Normocephalic and atraumatic.   Nose: Nose normal.   Mouth/Throat: Oropharynx is clear and moist.   Eyes: Conjunctivae and EOM are normal. Pupils are equal, round, and reactive to light.   Neck: Neck supple.   Normal range of motion.  Cardiovascular:  Normal rate, regular rhythm, normal heart sounds and intact distal pulses.           Pulmonary/Chest: Effort normal and breath sounds normal. No respiratory distress. She has no wheezes. She has no rhonchi. She has no rales. She exhibits no tenderness.   Abdominal: Abdomen is soft and flat. Bowel sounds are normal. She exhibits no distension. There is no abdominal tenderness. There is no rebound, no guarding, no tenderness at McBurney's point and negative Crenshaw's sign. negative psoas sign  Musculoskeletal:         General: Normal range of motion.      Cervical back: Normal range of motion and neck supple.      Lumbar back: Tenderness present. No swelling, edema, deformity or signs of trauma.        Back:      Neurological: She is alert and oriented to person, place, and time. She has normal strength and normal reflexes.   Skin: Skin is warm and dry. Capillary refill takes less than 2 seconds.   Psychiatric: She has a normal mood and affect. Her speech is normal and behavior is normal. Judgment and thought content normal.       ED Course   Procedures  Labs Reviewed -  No data to display       Imaging Results    None          Medications   ketorolac injection 30 mg (has no administration in time range)   ondansetron injection 4 mg (has no administration in time range)     Medical Decision Making:   History:   Old Medical Records: I decided to obtain old medical records.  Old Records Summarized: other records.       <> Summary of Records: 07/02/23 20:22  WBC: 9.61  RBC: 4.57  Hemoglobin: 13.9  Hematocrit: 41.6  MCV: 91.0  MCH: 30.4  MCHC: 33.4  RDW: 12.7  Platelets: 198  MPV: 10.8 (H)  Neut %: 74.7  LYMPH %: 17.5  Mono %: 4.5  Eosinophil %: 2.7  Basophil %: 0.4  Immature Granulocytes: 0.2  Neut #: 7.18  Lymph #: 1.68  Mono #: 0.43  Eos #: 0.26  Baso #: 0.04  Immature Grans (Abs): 0.02  nRBC: 0.0  Sodium: 134 (L)  Potassium: 4.2  Chloride: 107  CO2: 17 (L)  Anion Gap: 10.0  BUN: 13.3  Creatinine: 0.83  BUN/CREAT RATIO: 16  eGFR: >60  Glucose: 93  Calcium: 9.6    (H): Data is abnormally high  (L): Data is abnormally low    CT Abdomen Pelvis With Contrast  Order: [id number]  Status: Final result     Visible to patient: Yes (seen)     Next appt: 07/18/2023 at 01:00 PM in Internal Medicine ([facility])     0 Result Notes  Details      Reading Physician Reading Date Result Priority  Dallas Rg MD  [phone] 7/3/2023 STAT  Kofi Harmon MD  [phone] 7/3/2023     Narrative & Impression  EXAMINATION:  CT ABDOMEN PELVIS WITH CONTRAST     Technique: CT of the abdomen and pelvis was performed with axial images as well as sagittal and coronal reconstruction images with intravenous contrast.     Comparison: None available.     Clinical History: Bowel obstruction suspected.     Dosage Information: Automated Exposure Control was utilized 224.81 mGy.cm.     Findings:     Lines and Tubes: None.     Thorax:     Lungs: The visualized lung bases appear unremarkable.     Pleura: No effusions or thickening are seen. No pneumothorax is seen in the visualized lung bases.      Heart: The heart size is within normal limits.     Abdomen:     Abdominal Wall: No abdominal wall pathology is seen.     Liver: The liver appears unremarkable.     Biliary System: No intrahepatic or extrahepatic biliary duct dilatation is seen.     Gallbladder: Surgical clips are seen in the gallbladder fossa which may reflect prior cholecystectomy.     Pancreas: The pancreas appears unremarkable.     Spleen: The spleen appears unremarkable.     Adrenals: The adrenal glands appear unremarkable.     Kidneys: The right kidney appears unremarkable with no stones cysts masses or hydronephrosis. A single stone measuring 7.4 mm is seen on Image 38, Series 2 in the upper pole of the left kidney. The left kidney otherwise appears unremarkable with no cysts masses or hydronephrosis identified.     Aorta: The abdominal aorta appears unremarkable.     IVC: Unremarkable.     Bowel: There are multiple fluid filled small bowel loops which are mildly distended. There is mild fluid-filled distension of the cecum, ascending colon and transverse colon till the level of junction of splenic flexure and upper descending colon which shows focal circumferential wall thickening and mucosal enhancement. There is surrounding localized fat stranding in the left upper quadrant.     Esophagus: The visualized esophagus appears unremarkable.     Stomach: The stomach appears unremarkable.     Duodenum: Unremarkable appearing duodenum.     Colon: Nondistended.     Appendix: The appendix is not identified but no inflammatory changes are seen in the right lower quadrant to suggest appendicitis.     Peritoneum: No intraperitoneal free air or ascites is seen.     Pelvis:     Bladder: The bladder appears unremarkable.     Female:     Uterus: The uterus is not identified consistent with hysterectomy. The uterine stump appears bulky and shows low attenuation on series 2 image 108 such that any underlying lesion is not excluded.     Ovaries: No adnexal  masses are seen.     Bony structures:     Dorsal Spine: There is levoconvex scoliosis of the lumbar spine.     Bony Pelvis: The visualized bony structures of the pelvis appear unremarkable.     Impression:  Impression:     1. The uterus is not identified consistent with hysterectomy. The uterine stump appears bulky and shows low attenuation on series 2 image 108 such that any underlying lesion is not excluded. Correlate clinically as regards additional evaluation and follow-up.     2. There are multiple fluid filled small bowel loops which are mildly distended. There is mild fluid-filled distension of the cecum, ascending colon and transverse colon till the level of junction of splenic flexure and upper descending colon which shows focal circumferential wall thickening and mucosal enhancement. There is surrounding localized fat stranding in the left upper quadrant. These findings are suggestive pronounced enterocolitis. Correlate with clinical and laboratory findings as regards infectious versus inflammatory etiology.     3. Details and other findings as discussed above.     I concur with the preliminary report above.       Electronically signed by: Kofi Harmon  Date:                                            07/03/2023  Time:                                           10:24      Differential Diagnosis:   Kidney stone  Clinical Tests:   Lab Tests: Reviewed  Radiological Study: Reviewed  ED Management:  12:53 PM Reassessed patient at this time. Reports condition has improved. Discussed with patient all pertinent ED information and results. Discussed diagnosis and treatment plan with patient. Follow up instructions and return to ED instruction have been given. All questions and concerns were addressed at this time. Patient voices understanding of information and instructions. Patient is comfortable with plan and discharge. Patient is stable for discharge.                           Clinical Impression:   Final  diagnoses:  [N20.0] Left renal stone (Primary)  [R10.12] Left upper quadrant abdominal pain        ED Disposition Condition    Discharge Stable          ED Prescriptions       Medication Sig Dispense Start Date End Date Auth. Provider    ciprofloxacin HCl (CIPRO) 500 MG tablet Take 1 tablet (500 mg total) by mouth 2 (two) times daily. for 7 days 14 tablet 7/3/2023 7/10/2023 PEB[facility]ES Junior Jr.    ondansetron (ZOFRAN-ODT) 4 MG TbDL Take 1 tablet (4 mg total) by mouth every 8 (eight) hours as needed (Nausea). 9 tablet 7/3/2023 7/6/2023 PEBBLES Junior Jr.    tamsulosin (FLOMAX) 0.4 mg Cap Take 1 capsule (0.4 mg total) by mouth once daily. for 10 days 10 capsule 7/3/2023 7/13/2023 PEBBLES Junior Jr.    ketorolac (TORADOL) 10 mg tablet Take 1 tablet (10 mg total) by mouth every 6 (six) hours as needed for Pain. 20 tablet 7/3/2023 -- PEBBLES Junior Jr.          Follow-up Information       Follow up With Specialties Details Why Contact Info    [facility] In 3 days  [facility] [id number] [phone]      [facility] Emergency Medicine In 3 days As needed, If symptoms worsen [address] [place] [id number] [phone]    [facility]  Schedule an appointment as soon as possible for a visit in 1 week Follow up with [facility] for evaluation. [facility] [id number]             PEBBLES Junior Jr.  07/03/23 1450

## 2023-07-07 ENCOUNTER — HOSPITAL ENCOUNTER (EMERGENCY)
Facility: HOSPITAL | Age: 43
Discharge: HOME | End: 2023-07-08
Attending: INTERNAL MEDICINE
Payer: MEDICAID

## 2023-07-07 DIAGNOSIS — N39.0 URINARY TRACT INFECTION WITHOUT HEMATURIA, SITE UNSPECIFIED: ICD-10-CM

## 2023-07-07 DIAGNOSIS — A58 VAGINAL CUFF GRANULOMA: Primary | ICD-10-CM

## 2023-07-07 DIAGNOSIS — K59.00 CONSTIPATION, UNSPECIFIED CONSTIPATION TYPE: ICD-10-CM

## 2023-07-07 DIAGNOSIS — K52.9 ENTEROCOLITIS: ICD-10-CM

## 2023-07-07 LAB
ALBUMIN SERPL-MCNC: 3.8 G/DL (ref 3.5–5)
ALBUMIN/GLOB SERPL: 1.1 RATIO (ref 1.1–2)
ALP SERPL-CCNC: 100 UNIT/L (ref 40–150)
ALT SERPL-CCNC: 8 UNIT/L (ref 0–55)
APPEARANCE UR: CLEAR
AST SERPL-CCNC: 12 UNIT/L (ref 5–34)
BACTERIA #/AREA URNS AUTO: ABNORMAL /HPF
BASOPHILS # BLD AUTO: 0.02 X10(3)/MCL
BASOPHILS NFR BLD AUTO: 0.3 %
BILIRUB UR QL STRIP.AUTO: NEGATIVE MG/DL
BILIRUBIN DIRECT+TOT PNL SERPL-MCNC: 0.2 MG/DL
BUN SERPL-MCNC: 10 MG/DL (ref 7–18.7)
C TRACH DNA SPEC QL NAA+PROBE: NOT DETECTED
CALCIUM SERPL-MCNC: 9 MG/DL (ref 8.4–10.2)
CHLORIDE SERPL-SCNC: 110 MMOL/L (ref 98–107)
CO2 SERPL-SCNC: 21 MMOL/L (ref 22–29)
COLOR UR: ABNORMAL
CREAT SERPL-MCNC: 0.87 MG/DL (ref 0.55–1.02)
EOSINOPHIL # BLD AUTO: 0.21 X10(3)/MCL (ref 0–0.9)
EOSINOPHIL NFR BLD AUTO: 3.2 %
ERYTHROCYTE [DISTWIDTH] IN BLOOD BY AUTOMATED COUNT: 12.7 % (ref 11.5–17)
GFR SERPLBLD CREATININE-BSD FMLA CKD-EPI: >60 MLS/MIN/1.73/M2
GLOBULIN SER-MCNC: 3.5 GM/DL (ref 2.4–3.5)
GLUCOSE SERPL-MCNC: 103 MG/DL (ref 74–100)
GLUCOSE UR QL STRIP.AUTO: NORMAL MG/DL
HCT VFR BLD AUTO: 38 % (ref 37–47)
HGB BLD-MCNC: 13 G/DL (ref 12–16)
HYALINE CASTS #/AREA URNS LPF: ABNORMAL /LPF
IMM GRANULOCYTES # BLD AUTO: 0.02 X10(3)/MCL (ref 0–0.04)
IMM GRANULOCYTES NFR BLD AUTO: 0.3 %
KETONES UR QL STRIP.AUTO: NEGATIVE MG/DL
LEUKOCYTE ESTERASE UR QL STRIP.AUTO: 250 UNIT/L
LYMPHOCYTES # BLD AUTO: 1 X10(3)/MCL (ref 0.6–4.6)
LYMPHOCYTES NFR BLD AUTO: 15.3 %
MCH RBC QN AUTO: 30.7 PG (ref 27–31)
MCHC RBC AUTO-ENTMCNC: 34.2 G/DL (ref 33–36)
MCV RBC AUTO: 89.6 FL (ref 80–94)
MONOCYTES # BLD AUTO: 0.37 X10(3)/MCL (ref 0.1–1.3)
MONOCYTES NFR BLD AUTO: 5.7 %
MUCOUS THREADS URNS QL MICRO: ABNORMAL /LPF
N GONORRHOEA DNA SPEC QL NAA+PROBE: NOT DETECTED
NEUTROPHILS # BLD AUTO: 4.91 X10(3)/MCL (ref 2.1–9.2)
NEUTROPHILS NFR BLD AUTO: 75.2 %
NITRITE UR QL STRIP.AUTO: NEGATIVE
NRBC BLD AUTO-RTO: 0 %
PH UR STRIP.AUTO: 6 [PH]
PLATELET # BLD AUTO: 246 X10(3)/MCL (ref 130–400)
PMV BLD AUTO: 10.5 FL (ref 7.4–10.4)
POTASSIUM SERPL-SCNC: 3.6 MMOL/L (ref 3.5–5.1)
PROT SERPL-MCNC: 7.3 GM/DL (ref 6.4–8.3)
PROT UR QL STRIP.AUTO: NEGATIVE MG/DL
RBC # BLD AUTO: 4.24 X10(6)/MCL (ref 4.2–5.4)
RBC #/AREA URNS AUTO: ABNORMAL /HPF
RBC UR QL AUTO: NEGATIVE UNIT/L
SODIUM SERPL-SCNC: 140 MMOL/L (ref 136–145)
SP GR UR STRIP.AUTO: 1.01
SQUAMOUS #/AREA URNS LPF: ABNORMAL /HPF
UROBILINOGEN UR STRIP-ACNC: NORMAL MG/DL
WBC # SPEC AUTO: 6.53 X10(3)/MCL (ref 4.5–11.5)
WBC #/AREA URNS AUTO: ABNORMAL /HPF

## 2023-07-07 PROCEDURE — 85025 COMPLETE CBC W/AUTO DIFF WBC: CPT | Performed by: PHYSICIAN ASSISTANT

## 2023-07-07 PROCEDURE — 99285 EMERGENCY DEPT VISIT HI MDM: CPT | Mod: 25

## 2023-07-07 PROCEDURE — 63600175 PHARM REV CODE 636 W HCPCS: Performed by: PHYSICIAN ASSISTANT

## 2023-07-07 PROCEDURE — 80053 COMPREHEN METABOLIC PANEL: CPT | Performed by: PHYSICIAN ASSISTANT

## 2023-07-07 PROCEDURE — 87088 URINE BACTERIA CULTURE: CPT | Performed by: PHYSICIAN ASSISTANT

## 2023-07-07 PROCEDURE — 96374 THER/PROPH/DIAG INJ IV PUSH: CPT

## 2023-07-07 PROCEDURE — 81001 URINALYSIS AUTO W/SCOPE: CPT | Performed by: PHYSICIAN ASSISTANT

## 2023-07-07 PROCEDURE — 87591 N.GONORRHOEAE DNA AMP PROB: CPT | Performed by: PHYSICIAN ASSISTANT

## 2023-07-07 PROCEDURE — 25500020 PHARM REV CODE 255

## 2023-07-07 RX ORDER — KETOROLAC TROMETHAMINE 30 MG/ML
15 INJECTION, SOLUTION INTRAMUSCULAR; INTRAVENOUS
Status: COMPLETED | OUTPATIENT
Start: 2023-07-07 | End: 2023-07-07

## 2023-07-07 RX ADMIN — KETOROLAC TROMETHAMINE 15 MG: 30 INJECTION, SOLUTION INTRAMUSCULAR; INTRAVENOUS at 11:07

## 2023-07-07 RX ADMIN — IOHEXOL 100 ML: 350 INJECTION, SOLUTION INTRAVENOUS at 11:07

## 2023-07-08 VITALS
SYSTOLIC BLOOD PRESSURE: 120 MMHG | DIASTOLIC BLOOD PRESSURE: 91 MMHG | TEMPERATURE: 98 F | BODY MASS INDEX: 21.38 KG/M2 | OXYGEN SATURATION: 100 % | RESPIRATION RATE: 16 BRPM | HEIGHT: 59 IN | WEIGHT: 106.06 LBS | HEART RATE: 74 BPM

## 2023-07-08 RX ORDER — POLYETHYLENE GLYCOL 3350 17 G/17G
17 POWDER, FOR SOLUTION ORAL DAILY PRN
Qty: 14 EACH | Refills: 0 | Status: SHIPPED | OUTPATIENT
Start: 2023-07-08 | End: 2023-08-17

## 2023-07-08 NOTE — DISCHARGE INSTRUCTIONS
Continue taking Ciprofloxacin as previously prescribed due to bladder infection.  Continue taking Colace along with the MiraLax prescribed today for constipation.  Drink lots of water daily.  Symptoms may take up to 10 days to get better.  Return immediately with any worsening symptoms.  Follow up with the primary care provider later this month as planned.  Referral sent to gynecology clinic due to small vaginal lesions seen on CT.  They will call you to schedule an appointment.

## 2023-07-08 NOTE — ED PROVIDER NOTES
Encounter Date: 2023       History     Chief Complaint   Patient presents with    Flank Pain     Left flank pain x 7 days; seen here previously and dx with renal calculi. Pain has not subsided.     42-year-old female with a history of depression, hepatitis-B, and insomnia, presents to the emergency department with left leg pain x 7 days.  She was previously seen here and told she had a renal calculi however pain is worsening.  She rates her pain 9/10.  She denies diarrhea, nausea, vomiting, chest pain, shortness of breath, dizziness, headache.    The history is provided by the patient. No  was used.   Review of patient's allergies indicates:   Allergen Reactions    Metronidazole Shortness Of Breath    Lortab [hydrocodone-acetaminophen]      Unsure    Tylenol [acetaminophen]      Unsure     Past Medical History:   Diagnosis Date    Depression     Hepatitis B     Insomnia      Past Surgical History:   Procedure Laterality Date     SECTION  Unsure    CHOLECYSTECTOMY  Unsure    HYSTERECTOMY      TUBAL LIGATION       Family History   Problem Relation Age of Onset    Hypertension Mother     Diabetes Father     Hepatitis Sister     Scoliosis Sister      Social History     Tobacco Use    Smoking status: Never     Passive exposure: Never    Smokeless tobacco: Never   Substance Use Topics    Alcohol use: Never    Drug use: Never     Review of Systems   Constitutional:  Negative for chills and fever.   Respiratory:  Negative for cough, chest tightness and shortness of breath.    Cardiovascular:  Negative for chest pain and palpitations.   Gastrointestinal:  Negative for abdominal pain (left upper), diarrhea, nausea and vomiting.   Genitourinary:  Positive for flank pain (left). Negative for decreased urine volume and dysuria.   Musculoskeletal:  Positive for back pain (left).   Skin:  Negative for color change, rash and wound.   Neurological:  Negative for dizziness, light-headedness,  numbness and headaches.     Physical Exam     Initial Vitals [07/07/23 2030]   BP Pulse Resp Temp SpO2   106/76 80 16 98.1 °F (36.7 °C) 99 %      MAP       --         Physical Exam    Nursing note and vitals reviewed.  Constitutional: She appears well-developed and well-nourished.   HENT:   Nose: Nose normal.   Mouth/Throat: Oropharynx is clear and moist.   Eyes: Conjunctivae are normal.   Neck: Neck supple.   Normal range of motion.  Cardiovascular:  Normal rate, regular rhythm, normal heart sounds and intact distal pulses.           Pulmonary/Chest: Breath sounds normal. No respiratory distress. She has no wheezes.   Abdominal: Abdomen is soft. Bowel sounds are normal. There is abdominal tenderness (left upper). There is no rebound and no guarding.   Musculoskeletal:         General: Normal range of motion.      Cervical back: Normal range of motion and neck supple.     Neurological: She is alert.   Skin: Skin is warm. Capillary refill takes less than 2 seconds.       ED Course   Procedures  Labs Reviewed   COMPREHENSIVE METABOLIC PANEL - Abnormal; Notable for the following components:       Result Value    Chloride 110 (*)     Carbon Dioxide 21 (*)     Glucose Level 103 (*)     All other components within normal limits   URINALYSIS, REFLEX TO URINE CULTURE - Abnormal; Notable for the following components:    Leukocyte Esterase,  (*)     WBC, UA 11-20 (*)     Bacteria, UA Trace (*)     Squamous Epithelial Cells, UA Occ (*)     Mucous, UA Trace (*)     All other components within normal limits   CBC WITH DIFFERENTIAL - Abnormal; Notable for the following components:    MPV 10.5 (*)     All other components within normal limits   CHLAMYDIA/GONORRHOEAE(GC), PCR - Normal    Narrative:     The Xpert CT/NG test, performed on the [employer] system is a qualitative in vitro real-time polymerase chain reaction (PCR) test for the automated detected and differentiation for genomic DNA from Chlamydia trachomatis (CT)  and/or Neisseria gonorrhoeae (NG).   CULTURE, URINE   CBC W/ AUTO DIFFERENTIAL    Narrative:     The following orders were created for panel order CBC Auto Differential.  Procedure                               Abnormality         Status                     ---------                               -----------         ------                     CBC with Differential[[id number]]        Abnormal            Final result                 Please view results for these tests on the individual orders.   EXTRA TUBES    Narrative:     The following orders were created for panel order EXTRA TUBES.  Procedure                               Abnormality         Status                     ---------                               -----------         ------                     Light Blue Top Hold[[id number]]                              In process                 Red Top Hold[[id number]]                                     In process                   Please view results for these tests on the individual orders.   LIGHT BLUE TOP HOLD   RED TOP HOLD          Imaging Results              CT Abdomen Pelvis With Contrast (Preliminary result)  Result time 07/08/23 00:30:20      Preliminary result by Dallas Rg MD (07/08/23 00:30:20)                   Narrative:    START OF REPORT:  Technique: CT of the abdomen and pelvis was performed with axial images as well as sagittal and coronal reconstruction images with intravenous contrast but without oral contrast.    Comparison: Comparison is with study dated2023-07-02 21:39:46.    Clinical History: Flank Pain (Left flank pain x 7 days; seen here previously and dx with renal calculi. Pain has not subsided.    Dosage Information: Automated Exposure Control was utilized.    Findings:  Lines and Tubes: None.  Thorax:  Lungs: The visualized lung bases appear unremarkable.  Pleura: No pleural effusion is seen.  Heart: The heart size is within normal limits.  Abdomen:  Abdominal Wall: No abdominal wall  pathology is seen.  Liver: The liver appears unremarkable.  Biliary System: No intrahepatic or extrahepatic biliary duct dilatation is seen.  Gallbladder: Surgical clips are seen in the gallbladder fossa consistent with prior cholecystectomy.  Pancreas: The pancreas appears unremarkable.  Spleen: The spleen appears unremarkable.  Adrenals: The adrenal glands appear unremarkable.  Kidneys: There is a 7 mm nonobstructing stone in the upper pole of the left kidney (series 2 image 35). This is unchanged since the prior examination. Focal cortical scarring is again seen at the upper pole of the right kidney. The kidneys otherwise appear unremarkable with no hydronephrosis identified.  Aorta: The abdominal aorta appears unremarkable.  IVC: Unremarkable.  Bowel: There are numerous loops of small bowl which are fluid-filled and demonstrate subtle mucosal enhancement. There is mild circumferential wall thickening of the splenic flexure of colon with.  Esophagus: The visualized esophagus appears unremarkable.  Stomach: The stomach appears unremarkable.  Duodenum: Unremarkable appearing duodenum.  Appendix: No appendix is identified and a suture line is seen at the base of the cecum consistent with appendectomy.  Peritoneum: Trace free fluid is seen in the pelvis. No intraperitoneal free gas is seen.    Pelvis:  Bladder: The bladder is nondistended and cannot be definitively evaluated.  Female:  Uterus: There is a 2.8 cm hypodense lesion at the cephalad aspect of the vaginal cuff (series 2 image 106).  Ovaries: The ovaries are not identified.    Bony structures:  Dorsal Spine: Mild S-shaped scoliosis of the thoracolumbar spine is seen.  Bony Pelvis: The visualized bony structures of the pelvis appear unremarkable.    Notifications: The results were discussed with the emergency room physician (Dr Walker) prior to dictation at 2023-07-08 01:22:32 CDT.      Impression:  1. There are numerous loops of small bowl which are  fluid-filled and demonstrate subtle mucosal enhancement. There is mild circumferential wall thickening of the splenic flexure of colon with. These findings could reflect enterocolitis with an element of constipation.  2. Details and other findings as discussed above.                                         Medications   ketorolac injection 15 mg (15 mg Intravenous Given 7/7/23 2300)   iohexoL (OMNIPAQUE 350) 350 mg iodine/mL injection (100 mLs Intravenous Given 7/7/23 2315)     Medical Decision Making:   Initial Assessment:   42-year-old female with a history of depression, hepatitis-B, and insomnia, presents to the emergency department with left leg pain x 7 days.    Differential Diagnosis:   Kidney stone  Constipation  UTI  STI  Gastritis  Colitis  Clinical Tests:   Lab Tests: Ordered and Reviewed  Radiological Study: Ordered and Reviewed  ED Management:  Toradol 15 mg IV given    CT ordered due to patient stating the pain is worse and is in significant pain.       CT abdomen pelvis with contrast:1. There are numerous loops of small bowl which are fluid-filled and demonstrate subtle mucosal enhancement. There is mild circumferential wall thickening of the splenic flexure of colon with. These findings could reflect enterocolitis with an element of constipation.  2. Details and other findings as discussed above.     Bladder infection.  Imaging shows significant constipation with enterocolitis    She only took 2 cipro pills.  She can continue taking this for UTI.  Prescribed Darcy lax for constipation.    She states she feels much better and does not need anymore pain medication.      Referral sent to gyn for hypodense lesion seen on vaginal cuff  The patient is resting comfortably and in no acute distress.  She states that her symptoms have improved after treatment in Emergency Department. I personally discussed her test results and treatment plan.  Gave strict ED precautions, discussed specific conditions for return  to the emergency department and importance of follow up with her primary care provider and gyn.  Patient voices understanding and agrees to the plan discussed. All patients' questions have been answered at this time.   She has remained hemodynamically stable throughout entire stay in ED and is stable for discharge home.            Attending Attestation:     Physician Attestation Statement for NP/PA:   I have directed and reviewed the workup performed by the PA/NP.  I performed the substantive portion of the medical decision making.     Other NP/PA Attestation Additions:    History of Present Illness: Patient came to the emergency room 2nd time for the complaint of diffuse abdominal pain.  She was here few days back and was put on Cipro, and she has taken 2 of those pills since he was prescribed and today she comes to the emergency room because the pain is not getting better.  And she is very concerned about it.   Physical Exam: I did not do a physical examine this patient   Medical Decision Making: I talked to the radiologist about the findings on the CT scan, radiologist sees that there is the mass at the vaginal cuff where she had hysterectomy in the past, that has not changed from the previous CT scan, she does have enterocolitis which appears to be improving    I talked to the PA in detail, reviewed the documentation and blood work and CT scan report in detail, and recommended on management of the patient mainly telling the patient that she must take the medicine prescribed earlier and finished that but at this time she does not have any surgical abdomen and does not need any intervention and she should follow with gyn to evaluate the small mass radiologist sees at the vaginal cuff where she had the hysterectomy.    Patient was reassured by PA, CT scan reports explained to the patient and patient discharged home to follow up with gyn.               ED Course as of 07/08/23 0419   Fri Jul 07, 2023 2107  Leukocytes, UA(!): 250 [ER]   2108 WBC, UA(!): 11-20 [ER]   2108 Bacteria, UA(!): Trace [ER]   2219 Leukocytes, UA(!): 250 [ER]   2219 WBC, UA(!): 11-20 [ER]   2219 Bacteria, UA(!): Trace [ER]   Sat Jul 08, 2023   0124 CT Abdomen Pelvis With Contrast  1. There are numerous loops of small bowl which are fluid-filled and demonstrate subtle mucosal enhancement. There is mild circumferential wall thickening of the splenic flexure of colon with. These findings could reflect enterocolitis with an element of constipation.  2. Details and other findings as discussed above. 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 Provider    polyethylene glycol (GLYCOLAX) 17 gram PwPk Take 17 g by mouth daily as needed (constipation). 14 each 7/8/2023 -- GUY Rocha          Follow-up Information       Follow up With Specialties Details Why Contact Info    [facility] Emergency [facility] Emergency Medicine  As needed, If symptoms worsen [address] [facility] [address] [phone]    [facility] GYN Gynecology   [address] [address] [phone]             GUY Rocha  07/08/23 0200       Andrew Choi MD  07/08/23 7896

## 2023-07-10 LAB — BACTERIA UR CULT: NO GROWTH

## 2023-08-16 LAB
INR PPP: 0.9
PROTHROMBIN TIME: 12.2 SECONDS (ref 11.4–14)

## 2023-08-17 ENCOUNTER — OFFICE VISIT (OUTPATIENT)
Dept: INTERNAL MEDICINE | Facility: CLINIC | Age: 43
End: 2023-08-17
Payer: MEDICAID

## 2023-08-17 ENCOUNTER — PATIENT MESSAGE (OUTPATIENT)
Dept: INTERNAL MEDICINE | Facility: CLINIC | Age: 43
End: 2023-08-17

## 2023-08-17 VITALS
DIASTOLIC BLOOD PRESSURE: 73 MMHG | HEIGHT: 59 IN | TEMPERATURE: 98 F | RESPIRATION RATE: 20 BRPM | BODY MASS INDEX: 21.77 KG/M2 | WEIGHT: 108 LBS | HEART RATE: 66 BPM | SYSTOLIC BLOOD PRESSURE: 108 MMHG

## 2023-08-17 DIAGNOSIS — Z12.31 ENCOUNTER FOR SCREENING MAMMOGRAM FOR MALIGNANT NEOPLASM OF BREAST: ICD-10-CM

## 2023-08-17 DIAGNOSIS — M41.34 THORACOGENIC SCOLIOSIS OF THORACIC REGION: Primary | ICD-10-CM

## 2023-08-17 PROCEDURE — 3074F SYST BP LT 130 MM HG: CPT | Mod: CPTII,,, | Performed by: NURSE PRACTITIONER

## 2023-08-17 PROCEDURE — 1160F PR REVIEW ALL MEDS BY PRESCRIBER/CLIN PHARMACIST DOCUMENTED: ICD-10-PCS | Mod: CPTII,,, | Performed by: NURSE PRACTITIONER

## 2023-08-17 PROCEDURE — 3078F PR MOST RECENT DIASTOLIC BLOOD PRESSURE < 80 MM HG: ICD-10-PCS | Mod: CPTII,,, | Performed by: NURSE PRACTITIONER

## 2023-08-17 PROCEDURE — 99214 OFFICE O/P EST MOD 30 MIN: CPT | Mod: S$PBB,,, | Performed by: NURSE PRACTITIONER

## 2023-08-17 PROCEDURE — 1159F PR MEDICATION LIST DOCUMENTED IN MEDICAL RECORD: ICD-10-PCS | Mod: CPTII,,, | Performed by: NURSE PRACTITIONER

## 2023-08-17 PROCEDURE — 99214 PR OFFICE/OUTPT VISIT, EST, LEVL IV, 30-39 MIN: ICD-10-PCS | Mod: S$PBB,,, | Performed by: NURSE PRACTITIONER

## 2023-08-17 PROCEDURE — 3074F PR MOST RECENT SYSTOLIC BLOOD PRESSURE < 130 MM HG: ICD-10-PCS | Mod: CPTII,,, | Performed by: NURSE PRACTITIONER

## 2023-08-17 PROCEDURE — 3008F BODY MASS INDEX DOCD: CPT | Mod: CPTII,,, | Performed by: NURSE PRACTITIONER

## 2023-08-17 PROCEDURE — 3078F DIAST BP <80 MM HG: CPT | Mod: CPTII,,, | Performed by: NURSE PRACTITIONER

## 2023-08-17 PROCEDURE — 3008F PR BODY MASS INDEX (BMI) DOCUMENTED: ICD-10-PCS | Mod: CPTII,,, | Performed by: NURSE PRACTITIONER

## 2023-08-17 PROCEDURE — 99215 OFFICE O/P EST HI 40 MIN: CPT | Mod: PBBFAC | Performed by: NURSE PRACTITIONER

## 2023-08-17 PROCEDURE — 1159F MED LIST DOCD IN RCRD: CPT | Mod: CPTII,,, | Performed by: NURSE PRACTITIONER

## 2023-08-17 PROCEDURE — 1160F RVW MEDS BY RX/DR IN RCRD: CPT | Mod: CPTII,,, | Performed by: NURSE PRACTITIONER

## 2023-08-17 RX ORDER — ARIPIPRAZOLE 5 MG/1
5 TABLET ORAL
COMMUNITY
Start: 2023-07-25

## 2023-08-17 RX ORDER — METOPROLOL SUCCINATE 50 MG/1
25-50 TABLET, EXTENDED RELEASE ORAL EVERY MORNING
COMMUNITY
Start: 2023-08-08

## 2023-08-17 RX ORDER — CHLORPHENIRAMIN/PSEUDOEPHED/DM 1-15-5MG/5
17 LIQUID (ML) ORAL
COMMUNITY
Start: 2023-07-08

## 2023-08-17 RX ORDER — VENLAFAXINE 25 MG/1
25 TABLET ORAL DAILY
COMMUNITY
Start: 2023-07-12

## 2023-08-17 NOTE — PROGRESS NOTES
"  PEBBLES Chávez   [facility]  [address] [address]      PATIENT NAME: Raegan Paez  : 1980  DATE: 23  MRN: [id number]      Billing Provider: PEBBLES Chávez  Level of Service:   Patient PCP Information       Provider PCP Type    PEBBLES Chávez General            Reason for Visit / Chief Complaint: Follow-up (Wants to confirm Scoliosis diagnosis)       History of Present Illness / Problem Focused Workflow     Raegan Paez presents to the clinic with Follow-up (Wants to confirm Scoliosis diagnosis)     2022: 41 y.o.  presenting to [facility] to establish primary care.     Previous PCP: Jeffery Ma NP in [place]  PmHx: Anxiety and depression, intentional overdose of acetaminophen with subsequent coma/temporary tracheostomy (resolved)  SHx: see below  FHx: see below. Reports h/o cancer on both sides of the family but cannot ascertain any specific types  Complaints today: Establish care. Patient lives with friends who come to clinic here, so she wanted to switch services to here as well. She has a h/o anxiety, depression and insomnia. She's prescribed Klonopin TID per last PCP for anxiety and "sleep." She was in a coma about 10-11 years ago after her "ex- made me overdose on Norco and Tylenol." She was pregnant with twins at the time and miscarried. Also had a tracheostomy which was removed.  [place]. \[facility] [place] [facility]\"" hasn't taken Tylenol since that time. She follows [facility] [place] for women's needs. \[facility] [place] [facility]\"" last appt a few months ago. Reportedly had a negative pap smear. No acute concerns.    2022: Raegan is presenting to review labs. Lab review revealed: CBC unremarkable, glucose 103, A1c 4.9, FLP WNL, TSH WNL, UA with some WBCs and occ bacteria (urine culture no growth; patient denies urinary concerns), Hepatitis B surface antigen reactive. "The " "presence of hepatitis B surface antigen indicates acute or chronic hepatitis B infection." Today, patient reports that she's always been told "I was a carrier." States father is a carrier and sister has hepatitis B. She has no abdominal pain, fatigue, appetite changes, abd swelling, jaundice, dark urine, etc. No other concerns.    Note, patient following psychiatry outside of this facility.     1/18/23: Raegan is presenting for routine f/u. Accompanied by mother-in-law, Callie. States not sure why she's here today. She did visit the ED in November with L ankle pain after twisting ankle. Was told she sprained ankle and was referred to Ortho. By the time Ortho call, pain had resolved. She continues to follow ID for chronic Hep B which is currently being monitored. She had a negative mammogram in September. Today, her only concern is ongoing dry cough, nasal congestion, and throat irritation x 2 weeks. Visited a local UC at start of sx and was given benzonatate only. This is not effective. She does have sick contacts.     3/6/23: Patient presenting with concerns about a medication side effect. [facility] relates [facility] [place], [place], across from the hospital, for a routine pap.  was diagnosed with a vaginal infection and prescribed oral metronidazole 2/9/23.  took first dose, then experienced wheezing and trouble breathing shortly after. She called EMS and was transported to the ED. States, "They did a EKG and everything was fine." She was prescribed Metrogel which shows filled 2/24/23 and states "I've been doing fine with it." No other concerns.    5/24/23: Patient presenting with request for a referral to a counseling center in [place]. She has a history of anxiety and panic attacks and notes more panic attacks over the recent weeks. She's been following Shaista Kumar for anxiety and depression, as well as, medication management, but states mother-in-law sees a counselor at [facility] " "Management and she would like to be referred for the same. She denies CP or SOB. No SI/HI.    23: Patient presenting with concerns about scoliosis. She went to the ER in July with abd complaints. She had a CT A/P done 7/3/23 and states was told she had scoliosis on the CT. States here today because she needs to confirm the dx because she's trying to get disability. She states she has back pain that interferes with her ability to do ADLs, so her  must help her around the house.    "Dorsal Spine: There is levoconvex scoliosis of the lumbar spine."    No other concerns.         Review of Systems     Review of Systems   Constitutional: Negative.    HENT: Negative.     Eyes: Negative.    Respiratory: Negative.     Cardiovascular: Negative.    Gastrointestinal: Negative.    Endocrine: Negative.    Genitourinary: Negative.    Musculoskeletal:  Positive for back pain.   Skin: Negative.    Allergic/Immunologic: Negative.    Neurological: Negative.  Negative for dizziness, tremors, seizures, syncope, facial asymmetry, speech difficulty, weakness, light-headedness, numbness and headaches.   Hematological: Negative.    Psychiatric/Behavioral: Negative.  Negative for self-injury, sleep disturbance and suicidal ideas. The patient is not nervous/anxious.        Medical / Social / Family History     Past Medical History:   Diagnosis Date    Depression     Hepatitis B     Insomnia        Past Surgical History:   Procedure Laterality Date     SECTION  Unsure    CHOLECYSTECTOMY  Unsure    HYSTERECTOMY      TUBAL LIGATION         Social History  Ms.  reports that she has never smoked. She has never been exposed to tobacco smoke. She has never used smokeless tobacco. She reports that she does not drink alcohol and does not use drugs.    Family History  Ms.'s family history includes Diabetes in her father; Hepatitis in her sister; Hypertension in her mother; Scoliosis in her sister.    Medications and Allergies "     Medications  Medication List with Changes/Refills   Current Medications    ARIPIPRAZOLE (ABILIFY) 5 MG TAB    Take 5 mg by mouth.    CLEARLAX 17 GRAM/DOSE POWDER    Take 17 g by mouth.    FLUOXETINE 40 MG CAPSULE    Take 40 mg by mouth once daily.    HYDROXYZINE PAMOATE (VISTARIL) 25 MG CAP    TAKE 1 CAPSULE BY MOUTH TWICE DAILY AS NEEDED FOR ANXIETY    KETOROLAC (TORADOL) 10 MG TABLET    Take 1 tablet (10 mg total) by mouth every 6 (six) hours as needed for Pain.    METOPROLOL SUCCINATE (TOPROL-XL) 50 MG 24 HR TABLET    Take 25-50 mg by mouth every morning.    METRONIDAZOLE (METROGEL) 0.75 % (37.5MG/5 GRAM) VAGINAL GEL    Place 1 applicator vaginally every evening.    TRAZODONE (DESYREL) 50 MG TABLET    Take 50 mg by mouth nightly.    TRIAMCINOLONE ACETONIDE 0.1% (KENALOG) 0.1 % CREAM    Apply topically 2 (two) times daily.    VENLAFAXINE (EFFEXOR) 25 MG TAB    Take 25 mg by mouth Daily.    VENLAFAXINE (EFFEXOR-XR) 37.5 MG 24 HR CAPSULE    Take 37.5 mg by mouth Daily.    VENLAFAXINE (EFFEXOR-XR) 75 MG 24 HR CAPSULE    Take 75 mg by mouth Daily.   Discontinued Medications    CLONAZEPAM (KLONOPIN) 0.5 MG TABLET    Take 0.5 mg by mouth 3 (three) times daily.    FLUOXETINE 20 MG CAPSULE    Take 20 mg by mouth once daily.    METHYLPREDNISOLONE (MEDROL DOSEPACK) 4 MG TABLET    Take as package instructs    POLYETHYLENE GLYCOL (GLYCOLAX) 17 GRAM PWPK    Take 17 g by mouth daily as needed (constipation).    TAMSULOSIN (FLOMAX) 0.4 MG CAP    Take 1 capsule (0.4 mg total) by mouth once daily. for 10 days       Allergies  Review of patient's allergies indicates:   Allergen Reactions    Metronidazole Shortness Of Breath    Lortab [hydrocodone-acetaminophen]      Unsure    Tylenol [acetaminophen]      Unsure       Physical Examination     Vitals:    08/17/23 1335   BP: 108/73   Pulse: 66   Resp: 20   Temp: 98.4 °F (36.9 °C)       Physical Exam  Constitutional:       Appearance: Normal appearance.   HENT:      Head:  Normocephalic and atraumatic.   Pulmonary:      Effort: Pulmonary effort is normal.   Musculoskeletal:      Cervical back: Normal range of motion.      Thoracic back: Scoliosis present.        Back:    Neurological:      General: No focal deficit present.      Mental Status: She is alert and oriented to person, place, and time.   Psychiatric:         Mood and Affect: Mood normal.         Behavior: Behavior normal.         Thought Content: Thought content normal.         Judgment: Judgment normal.           Results     Lab Results   Component Value Date    WBC 6.53 07/07/2023    RBC 4.24 07/07/2023    HGB 13.0 07/07/2023    HCT 38.0 07/07/2023    MCV 89.6 07/07/2023    MCH 30.7 07/07/2023    MCHC 34.2 07/07/2023    RDW 12.7 07/07/2023     07/07/2023    MPV 10.5 (H) 07/07/2023     CMP  Sodium Level   Date Value Ref Range Status   07/07/2023 140 136 - 145 mmol/L Final     Potassium Level   Date Value Ref Range Status   07/07/2023 3.6 3.5 - 5.1 mmol/L Final     Carbon Dioxide   Date Value Ref Range Status   07/07/2023 21 (L) 22 - 29 mmol/L Final     Blood Urea Nitrogen   Date Value Ref Range Status   07/07/2023 10.0 7.0 - 18.7 mg/dL Final     Creatinine   Date Value Ref Range Status   07/07/2023 0.87 0.55 - 1.02 mg/dL Final     Calcium Level Total   Date Value Ref Range Status   07/07/2023 9.0 8.4 - 10.2 mg/dL Final     Albumin Level   Date Value Ref Range Status   07/07/2023 3.8 3.5 - 5.0 g/dL Final     Bilirubin Total   Date Value Ref Range Status   07/07/2023 0.2 <=1.5 mg/dL Final     Alkaline Phosphatase   Date Value Ref Range Status   07/07/2023 100 40 - 150 unit/L Final     Aspartate Aminotransferase   Date Value Ref Range Status   07/07/2023 12 5 - 34 unit/L Final     Alanine Aminotransferase   Date Value Ref Range Status   07/07/2023 8 0 - 55 unit/L Final     Estimated GFR-Non    Date Value Ref Range Status   07/05/2022 >60 mls/min/1.73/m2 Final     Lab Results   Component Value Date     "CHOL 189 07/05/2022     Lab Results   Component Value Date    HDL 52 07/05/2022     No results found for: "LDLCALC"  Lab Results   Component Value Date    TRIG 120 07/05/2022     No results found for: "CHOLHDL"  Lab Results   Component Value Date    TSH 3.4065 07/05/2022     Lab Results   Component Value Date    PROTEINUA Negative 07/07/2023    LEUKOCYTESUR 250 (A) 07/07/2023           Assessment and Plan (including Health Maintenance)     Plan:         Health Maintenance Due   Topic Date Due    COVID-19 Vaccine (1) Never done    Pneumococcal Vaccines (Age 0-64) (1 - PCV) Never done    TETANUS VACCINE  Never done    Mammogram  09/08/2023       Problem List Items Addressed This Visit    None  Visit Diagnoses       Thoracogenic scoliosis of thoracic region    -  Primary    Relevant Orders    Ambulatory referral/consult to Physical/Occupational Therapy              Health Maintenance Topics with due status: Not Due       Topic Last Completion Date    Influenza Vaccine Not Due       Future Appointments   Date Time Provider Department Center   8/24/2023  9:00 AM Daja Mason DO [facility] UROLO [facility]   8/24/2023 12:45 PM Flex Macedo FNP [facility] INTMED [facility]   11/6/2023  9:50 AM Amanda Barrow APRN [facility] INFDIS [facility]   4/3/2024  8:15 AM [facility]ID[facility], [facility] GYN [facility] GYN [facility]      For any new or worsening symptoms that are urgent, or for any s/s of MI, CVA, or any other emergent concerns, please visit the ER for further eval. Otherwise, call clinic with questions or concerns.      Signature:  PEBBLES Chávez  [facility]  [address] [address]    Date of encounter: 8/17/23            "

## 2023-10-04 ENCOUNTER — HOSPITAL ENCOUNTER (OUTPATIENT)
Dept: RADIOLOGY | Facility: HOSPITAL | Age: 43
Discharge: HOME | End: 2023-10-04
Attending: NURSE PRACTITIONER
Payer: MEDICAID

## 2023-10-04 DIAGNOSIS — B18.1 CHRONIC HEPATITIS B: ICD-10-CM

## 2023-10-04 PROCEDURE — 76705 ECHO EXAM OF ABDOMEN: CPT | Mod: TC

## 2023-10-05 ENCOUNTER — HOSPITAL ENCOUNTER (OUTPATIENT)
Dept: RADIOLOGY | Facility: HOSPITAL | Age: 43
Discharge: HOME | End: 2023-10-05
Attending: NURSE PRACTITIONER
Payer: MEDICAID

## 2023-10-05 DIAGNOSIS — Z12.31 ENCOUNTER FOR SCREENING MAMMOGRAM FOR MALIGNANT NEOPLASM OF BREAST: ICD-10-CM

## 2023-10-05 PROCEDURE — 77063 MAMMO DIGITAL SCREENING BILAT WITH TOMO: ICD-10-PCS | Mod: 26,,, | Performed by: RADIOLOGY

## 2023-10-05 PROCEDURE — 77067 MAMMO DIGITAL SCREENING BILAT WITH TOMO: ICD-10-PCS | Mod: 26,,, | Performed by: RADIOLOGY

## 2023-10-05 PROCEDURE — 77067 SCR MAMMO BI INCL CAD: CPT | Mod: 26,,, | Performed by: RADIOLOGY

## 2023-10-05 PROCEDURE — 77067 SCR MAMMO BI INCL CAD: CPT | Mod: TC

## 2023-10-05 PROCEDURE — 77063 BREAST TOMOSYNTHESIS BI: CPT | Mod: 26,,, | Performed by: RADIOLOGY

## 2023-11-10 ENCOUNTER — OFFICE VISIT (OUTPATIENT)
Dept: INFECTIOUS DISEASES | Facility: CLINIC | Age: 43
End: 2023-11-10
Payer: MEDICAID

## 2023-11-10 VITALS
SYSTOLIC BLOOD PRESSURE: 106 MMHG | HEART RATE: 77 BPM | DIASTOLIC BLOOD PRESSURE: 74 MMHG | TEMPERATURE: 98 F | RESPIRATION RATE: 20 BRPM | BODY MASS INDEX: 24.33 KG/M2 | WEIGHT: 120.69 LBS | HEIGHT: 59 IN

## 2023-11-10 DIAGNOSIS — B18.1 CHRONIC HEPATITIS B: Primary | ICD-10-CM

## 2023-11-10 LAB
AFP-TM SERPL-MCNC: 3.3 NG/ML
ALBUMIN SERPL-MCNC: 3.8 G/DL (ref 3.5–5)
ALBUMIN/GLOB SERPL: 1 RATIO (ref 1.1–2)
ALP SERPL-CCNC: 81 UNIT/L (ref 40–150)
ALT SERPL-CCNC: 14 UNIT/L (ref 0–55)
AST SERPL-CCNC: 14 UNIT/L (ref 5–34)
BASOPHILS # BLD AUTO: 0.03 X10(3)/MCL
BASOPHILS NFR BLD AUTO: 0.6 %
BILIRUB SERPL-MCNC: 0.4 MG/DL
BUN SERPL-MCNC: 13.3 MG/DL (ref 7–18.7)
CALCIUM SERPL-MCNC: 9.3 MG/DL (ref 8.4–10.2)
CHLORIDE SERPL-SCNC: 107 MMOL/L (ref 98–107)
CO2 SERPL-SCNC: 22 MMOL/L (ref 22–29)
CREAT SERPL-MCNC: 0.78 MG/DL (ref 0.55–1.02)
EOSINOPHIL # BLD AUTO: 0.22 X10(3)/MCL (ref 0–0.9)
EOSINOPHIL NFR BLD AUTO: 4.4 %
ERYTHROCYTE [DISTWIDTH] IN BLOOD BY AUTOMATED COUNT: 12.7 % (ref 11.5–17)
GFR SERPLBLD CREATININE-BSD FMLA CKD-EPI: >60 MLS/MIN/1.73/M2
GLOBULIN SER-MCNC: 3.8 GM/DL (ref 2.4–3.5)
GLUCOSE SERPL-MCNC: 104 MG/DL (ref 74–100)
HAV AB SER QL IA: REACTIVE
HBV SURFACE AG SERPL QL IA: REACTIVE
HBV SURFACE AG SERPLBLD QL IA.RAPID: NORMAL
HCT VFR BLD AUTO: 41.4 % (ref 37–47)
HCV AB SERPL QL IA: NONREACTIVE
HGB BLD-MCNC: 13.8 G/DL (ref 12–16)
HIV 1+2 AB+HIV1 P24 AG SERPL QL IA: NONREACTIVE
IMM GRANULOCYTES # BLD AUTO: 0.01 X10(3)/MCL (ref 0–0.04)
IMM GRANULOCYTES NFR BLD AUTO: 0.2 %
INR PPP: 0.9
LYMPHOCYTES # BLD AUTO: 1.14 X10(3)/MCL (ref 0.6–4.6)
LYMPHOCYTES NFR BLD AUTO: 22.7 %
MCH RBC QN AUTO: 30.8 PG (ref 27–31)
MCHC RBC AUTO-ENTMCNC: 33.3 G/DL (ref 33–36)
MCV RBC AUTO: 92.4 FL (ref 80–94)
MONOCYTES # BLD AUTO: 0.28 X10(3)/MCL (ref 0.1–1.3)
MONOCYTES NFR BLD AUTO: 5.6 %
NEUTROPHILS # BLD AUTO: 3.34 X10(3)/MCL (ref 2.1–9.2)
NEUTROPHILS NFR BLD AUTO: 66.5 %
NRBC BLD AUTO-RTO: 0 %
PLATELET # BLD AUTO: 249 X10(3)/MCL (ref 130–400)
PMV BLD AUTO: 10.8 FL (ref 7.4–10.4)
POTASSIUM SERPL-SCNC: 3.6 MMOL/L (ref 3.5–5.1)
PROT SERPL-MCNC: 7.6 GM/DL (ref 6.4–8.3)
PROTHROMBIN TIME: 12.2 SECONDS (ref 11.4–14)
RBC # BLD AUTO: 4.48 X10(6)/MCL (ref 4.2–5.4)
SODIUM SERPL-SCNC: 139 MMOL/L (ref 136–145)
T PALLIDUM AB SER QL: NONREACTIVE
WBC # SPEC AUTO: 5.02 X10(3)/MCL (ref 4.5–11.5)

## 2023-11-10 PROCEDURE — 99214 PR OFFICE/OUTPT VISIT, EST, LEVL IV, 30-39 MIN: ICD-10-PCS | Mod: S$PBB,,, | Performed by: NURSE PRACTITIONER

## 2023-11-10 PROCEDURE — 82105 ALPHA-FETOPROTEIN SERUM: CPT | Performed by: NURSE PRACTITIONER

## 2023-11-10 PROCEDURE — 87517 HEPATITIS B DNA QUANT: CPT | Performed by: NURSE PRACTITIONER

## 2023-11-10 PROCEDURE — 1160F RVW MEDS BY RX/DR IN RCRD: CPT | Mod: CPTII,,, | Performed by: NURSE PRACTITIONER

## 2023-11-10 PROCEDURE — 99214 OFFICE O/P EST MOD 30 MIN: CPT | Mod: S$PBB,,, | Performed by: NURSE PRACTITIONER

## 2023-11-10 PROCEDURE — 86708 HEPATITIS A ANTIBODY: CPT | Performed by: NURSE PRACTITIONER

## 2023-11-10 PROCEDURE — 99214 OFFICE O/P EST MOD 30 MIN: CPT | Mod: PBBFAC | Performed by: NURSE PRACTITIONER

## 2023-11-10 PROCEDURE — 3078F PR MOST RECENT DIASTOLIC BLOOD PRESSURE < 80 MM HG: ICD-10-PCS | Mod: CPTII,,, | Performed by: NURSE PRACTITIONER

## 2023-11-10 PROCEDURE — 3008F PR BODY MASS INDEX (BMI) DOCUMENTED: ICD-10-PCS | Mod: CPTII,,, | Performed by: NURSE PRACTITIONER

## 2023-11-10 PROCEDURE — 1160F PR REVIEW ALL MEDS BY PRESCRIBER/CLIN PHARMACIST DOCUMENTED: ICD-10-PCS | Mod: CPTII,,, | Performed by: NURSE PRACTITIONER

## 2023-11-10 PROCEDURE — 87389 HIV-1 AG W/HIV-1&-2 AB AG IA: CPT | Performed by: NURSE PRACTITIONER

## 2023-11-10 PROCEDURE — 87341 HEP B SURFACE AG NEUTRLZJ IA: CPT | Performed by: NURSE PRACTITIONER

## 2023-11-10 PROCEDURE — 3074F SYST BP LT 130 MM HG: CPT | Mod: CPTII,,, | Performed by: NURSE PRACTITIONER

## 2023-11-10 PROCEDURE — 3078F DIAST BP <80 MM HG: CPT | Mod: CPTII,,, | Performed by: NURSE PRACTITIONER

## 2023-11-10 PROCEDURE — 80053 COMPREHEN METABOLIC PANEL: CPT | Performed by: NURSE PRACTITIONER

## 2023-11-10 PROCEDURE — 87340 HEPATITIS B SURFACE AG IA: CPT | Performed by: NURSE PRACTITIONER

## 2023-11-10 PROCEDURE — 85025 COMPLETE CBC W/AUTO DIFF WBC: CPT | Performed by: NURSE PRACTITIONER

## 2023-11-10 PROCEDURE — 86803 HEPATITIS C AB TEST: CPT | Performed by: NURSE PRACTITIONER

## 2023-11-10 PROCEDURE — 1159F PR MEDICATION LIST DOCUMENTED IN MEDICAL RECORD: ICD-10-PCS | Mod: CPTII,,, | Performed by: NURSE PRACTITIONER

## 2023-11-10 PROCEDURE — 3008F BODY MASS INDEX DOCD: CPT | Mod: CPTII,,, | Performed by: NURSE PRACTITIONER

## 2023-11-10 PROCEDURE — 36415 COLL VENOUS BLD VENIPUNCTURE: CPT | Performed by: NURSE PRACTITIONER

## 2023-11-10 PROCEDURE — 86780 TREPONEMA PALLIDUM: CPT | Performed by: NURSE PRACTITIONER

## 2023-11-10 PROCEDURE — 3074F PR MOST RECENT SYSTOLIC BLOOD PRESSURE < 130 MM HG: ICD-10-PCS | Mod: CPTII,,, | Performed by: NURSE PRACTITIONER

## 2023-11-10 PROCEDURE — 1159F MED LIST DOCD IN RCRD: CPT | Mod: CPTII,,, | Performed by: NURSE PRACTITIONER

## 2023-11-10 PROCEDURE — 85610 PROTHROMBIN TIME: CPT | Performed by: NURSE PRACTITIONER

## 2023-11-10 NOTE — PROGRESS NOTES
Patient ID: Raegan Paez 43 y.o.     Chief Complaint:   Chief Complaint   Patient presents with    Followup Hep B     Denies problems        HPI:    11/10/23  Raegan is a 44 yo WF presenting today for HBV f/u visit. She is treatment naive and denies jaundice, icterus, nausea, vomiting, dark urine, or santhosh colored stools.  Last labs  AST 12, ALT 8;  HBV .  RUQ abd u/s 10/4/23 mild steatosis.  FibroScan not available today, will schedule for repeat scan in 2-4 weeks. Denies any new sexual partners. She declines flu vaccine today. Doing well overall. All questions answered & concerns addressed.    5/3/23  Raegan is a 41 yo WF here today for HBV f/u visit.  Last labs  HBV .  She denies jaundice, icterus, nausea, vomiting, dark urine, or santhosh colored stools.  She is treatment naive.  Baseline FibroScan  S1, F0-1.  Will repeat next visit. RUQ abd u/s repeated , mild steatosis noted. She is due for 2nd dose of Hep A vaccine today, amenable to same.  She tells me that her sister also has Hep B, followed by AXEL Vega NP & is prescribed treatment.  Guidelines explained to patient, voiced understanding.  She tells me that she is not eager for treatment, was just curious.  Will update labs & notify her if recommendations have changed based on updated labs. Voiced understanding & appreciation.     22  Raegan is a 41 yo WF presenting today for HBV lab results.  She is doing well today and denies any jaundice, icterus, nausea, vomiting, dark urine, or santhosh colored stools.  No family history of liver cancer.  She is amenable to Hep A vaccination dose #1 today, declines flu vaccine.  Lab results reviewed, chronic inactive infection.  Will continue to monitor.         Past Medical History:   Diagnosis Date    Depression     Hepatitis B     Insomnia         Past Surgical History:   Procedure Laterality Date     SECTION  Unsure    CHOLECYSTECTOMY  Unsure    HYSTERECTOMY      TUBAL  LIGATION  2011        Social History     Socioeconomic History    Marital status:      Spouse name: Carter    Number of children: 1   Tobacco Use    Smoking status: Never     Passive exposure: Never    Smokeless tobacco: Never   Substance and Sexual Activity    Alcohol use: Never    Drug use: Never    Sexual activity: Not Currently     Partners: Male     Social Determinants of Health     Financial Resource Strain: Low Risk  (1/18/2023)    Overall Financial Resource Strain (CARDIA)     Difficulty of Paying Living Expenses: Not hard at all   Food Insecurity: No Food Insecurity (1/18/2023)    Hunger Vital Sign     Worried About Running Out of Food in the Last Year: Never true     Ran Out of Food in the Last Year: Never true   Transportation Needs: No Transportation Needs (1/18/2023)    PRAPARE - Transportation     Lack of Transportation (Medical): No     Lack of Transportation (Non-Medical): No   Physical Activity: Inactive (1/18/2023)    Exercise Vital Sign     Days of Exercise per Week: 0 days     Minutes of Exercise per Session: 0 min   Stress: No Stress Concern Present (1/18/2023)    [employer] - Occupational Stress Questionnaire     Feeling of Stress : Not at all   Social Connections: Moderately Isolated (1/18/2023)    Social Connection and Isolation Panel [NHANES]     Frequency of Communication with Friends and Family: More than three times a week     Frequency of Social Gatherings with Friends and Family: More than three times a week     Attends Judaism Services: Never     Active Member of Clubs or Organizations: No     Attends Club or Organization Meetings: Never     Marital Status:    Housing Stability: Low Risk  (1/18/2023)    Housing Stability Vital Sign     Unable to Pay for Housing in the Last Year: No     Number of Places Lived in the Last Year: 1     Unstable Housing in the Last Year: No        Family History   Problem Relation Age of Onset    Hypertension  "Mother     Diabetes Father     Hepatitis Sister     Scoliosis Sister         Review of patient's allergies indicates:   Allergen Reactions    Metronidazole Shortness Of Breath    Lortab [hydrocodone-acetaminophen]      Unsure    Tylenol [acetaminophen]      Unsure        Immunization History   Administered Date(s) Administered    Hepatitis A, Adult 11/01/2022, 05/03/2023        Review of Systems   Constitutional: Negative.    HENT: Negative.     Eyes: Negative.    Respiratory: Negative.     Cardiovascular: Negative.    Gastrointestinal: Negative.    Genitourinary: Negative.    Musculoskeletal: Negative.    Skin: Negative.    Neurological: Negative.    Endo/Heme/Allergies: Negative.    Psychiatric/Behavioral: Negative.     All other systems reviewed and are negative.         Objective:      /74 (BP Location: Left arm, Patient Position: Sitting, BP Method: Medium (Automatic))   Pulse 77   Temp 98.4 °F (36.9 °C) (Oral)   Resp 20   Ht 4' 11" (1.499 m)   Wt 54.7 kg (120 lb 11.2 oz)   BMI 24.38 kg/m²      Physical Exam  Vitals reviewed.   Constitutional:       General: She is not in acute distress.     Appearance: Normal appearance. She is not toxic-appearing.   Eyes:      General: No scleral icterus.  Cardiovascular:      Rate and Rhythm: Normal rate and regular rhythm.      Heart sounds: Normal heart sounds.   Pulmonary:      Effort: Pulmonary effort is normal. No respiratory distress.      Breath sounds: Normal breath sounds.   Abdominal:      General: Bowel sounds are normal. There is no distension.      Palpations: Abdomen is soft. There is no mass.      Tenderness: There is no abdominal tenderness.   Musculoskeletal:         General: Normal range of motion.   Skin:     General: Skin is warm and dry.   Neurological:      Mental Status: She is alert and oriented to person, place, and time.          Labs:   Lab Results   Component Value Date    WBC 6.53 07/07/2023    HGB 13.0 07/07/2023    HCT 38.0 " 07/07/2023    MCV 89.6 07/07/2023     07/07/2023       CMP  Sodium Level   Date Value Ref Range Status   07/07/2023 140 136 - 145 mmol/L Final     Potassium Level   Date Value Ref Range Status   07/07/2023 3.6 3.5 - 5.1 mmol/L Final     Carbon Dioxide   Date Value Ref Range Status   07/07/2023 21 (L) 22 - 29 mmol/L Final     Blood Urea Nitrogen   Date Value Ref Range Status   07/07/2023 10.0 7.0 - 18.7 mg/dL Final     Creatinine   Date Value Ref Range Status   07/07/2023 0.87 0.55 - 1.02 mg/dL Final     Calcium Level Total   Date Value Ref Range Status   07/07/2023 9.0 8.4 - 10.2 mg/dL Final     Albumin Level   Date Value Ref Range Status   07/07/2023 3.8 3.5 - 5.0 g/dL Final     Bilirubin Total   Date Value Ref Range Status   07/07/2023 0.2 <=1.5 mg/dL Final     Alkaline Phosphatase   Date Value Ref Range Status   07/07/2023 100 40 - 150 unit/L Final     Aspartate Aminotransferase   Date Value Ref Range Status   07/07/2023 12 5 - 34 unit/L Final     Alanine Aminotransferase   Date Value Ref Range Status   07/07/2023 8 0 - 55 unit/L Final     eGFR   Date Value Ref Range Status   07/07/2023 >60 mls/min/1.73/m2 Final     Lab Results   Component Value Date    TSH 3.4065 07/05/2022     HIV   Date Value Ref Range Status   05/03/2023 Nonreactive Nonreactive Final     PT   Date Value Ref Range Status   05/03/2023 12.2 11.4 - 14.0 seconds Final     INR   Date Value Ref Range Status   05/03/2023 0.9 <=1.3 Corrected     Comment:     This is a corrected result. Previous result was 0.92 on 5/3/2023 at 1353 CDT.     Syphilis Antibody   Date Value Ref Range Status   05/03/2023 Nonreactive Nonreactive, Equivocal Final     Hepatitis B Surface Antigen   Date Value Ref Range Status   09/16/2022 Reactive (A) Nonreactive Final     Hepatitis B Surface Antibody   Date Value Ref Range Status   09/16/2022 Nonreactive Nonreactive Final     Hepatitis B Surface Antibody Qnt   Date Value Ref Range Status   09/16/2022 0.16 mIU/mL Final      Comment:     Interpretive Data Hep Bs Ab#  Result (mIU/mL)Interpretation - Hep B Surface Antibody  <8.00Nonreactive: Patient considered not immune to HBV infection  >=8.00 to <12.00Grayzone: Unable to determine immune status. Follow-up testing should be performed  >=12.00Reactive: Patient considered immune to HBV infection       Hep C Ab Interp   Date Value Ref Range Status   07/05/2022 Nonreactive Nonreactive Final     Hepatitis B Surface Antigen Confirmation   Date Value Ref Range Status   09/16/2022 Confirmed Positive  Final     Results for orders placed or performed in visit on 09/16/22   Hepatitis B e Antigen   Result Value Ref Range    Hepatitis Be Ag, S Negative Negative     Results for orders placed or performed in visit on 09/16/22   Hepatitis B e antibody   Result Value Ref Range    HBe Antibody Positive (A) Negative     Results for orders placed or performed in visit on 05/03/23   Hepatitis B DNA, Quant   Result Value Ref Range    HBV DNA Detect/Quant 883 (A) Undetected IU/mL       Imaging: Reviewed most recent relevant imaging studies available, notable results highlighted in this note      Medications:     Current Outpatient Medications   Medication Instructions    ARIPiprazole (ABILIFY) 5 mg, Oral    CLEARLAX 17 g, Oral    FLUoxetine 40 mg, Oral, Daily    hydrOXYzine pamoate (VISTARIL) 25 MG Cap TAKE 1 CAPSULE BY MOUTH TWICE DAILY AS NEEDED FOR ANXIETY    ketorolac (TORADOL) 10 mg, Oral, Every 6 hours PRN    metoprolol succinate (TOPROL-XL) 25-50 mg, Oral, Every morning    metroNIDAZOLE (METROGEL) 0.75 % (37.5mg/5 gram) vaginal gel 1 applicator, Vaginal, Nightly    traZODone (DESYREL) 50 mg, Oral, Nightly    triamcinolone acetonide 0.1% (KENALOG) 0.1 % cream Topical (Top), 2 times daily    venlafaxine (EFFEXOR) 25 mg, Oral, Daily    venlafaxine (EFFEXOR-XR) 37.5 mg, Oral, Daily    venlafaxine (EFFEXOR-XR) 75 mg, Oral, Daily       Assessment:       Problem List Items Addressed This Visit     None  Visit Diagnoses       Chronic hepatitis B    -  Primary    Relevant Orders    AFP Tumor Marker    Hepatitis C Antibody    HIV 1/2 Ag/Ab (4th Gen)    SYPHILIS ANTIBODY (WITH REFLEX RPR)    Hepatitis B Surface Antigen    Hepatitis B DNA, Quant    CBC Auto Differential    Comprehensive Metabolic Panel    Protime-INR    Hepatitis A antibody, IgG    US Elastography Liver w/imaging               Plan:      Chronic hepatitis B  -     AFP Tumor Marker; Future; Expected date: 11/10/2023  -     Hepatitis C Antibody; Future; Expected date: 11/10/2023  -     HIV 1/2 Ag/Ab (4th Gen); Future; Expected date: 11/10/2023  -     SYPHILIS ANTIBODY (WITH REFLEX RPR); Future; Expected date: 11/10/2023  -     Hepatitis B Surface Antigen; Future; Expected date: 11/10/2023  -     Hepatitis B DNA, Quant; Future; Expected date: 11/10/2023  -     CBC Auto Differential; Future; Expected date: 11/10/2023  -     Comprehensive Metabolic Panel  -     Protime-INR; Future; Expected date: 11/10/2023  -     Hepatitis A antibody, IgG; Future; Expected date: 11/10/2023  -     US Elastography Liver w/imaging; Future; Expected date: 11/24/2023  Chronic inactive.   Hep B s ag +, Hep B e ag -, Hep B e ab +, Hep B c ab +, Hep B s ab -.  HBV  (5/23)  ALT 8 (7/23)  RUQ abd u/s 10/4/23, mild steatosis  Fibroscan 9/22 S1, F0-1.  Blood precautions, do not share a razor, needle, toothbrush, or clippers with anyone.    Use condoms for sexual encounters.   Vaccination of all household members and close contacts strongly encouraged.  Avoid or minimize all alcohol intake.   Limit Tylenol use to 2 grams per day.   RUQ abdominal ultrasound every 6-12 months for liver cancer screening.   Lower fat diet, higher fiber diet.   Repeat labs today.  Repeat FibroScan within 2-4 weeks.  RTC 6 months with Amanda.          I spent a total of 34 minutes on the day of the visit.  This includes face to face time and non-face to face time preparing to see the patient  (eg, review of tests), obtaining and/or reviewing separately obtained history, documenting clinical information in the electronic or other health record, independently interpreting results and communicating results to the patient/family/caregiver, or care coordinator.

## 2023-11-13 LAB — HBV DNA SERPL NAA+PROBE-ACNC: 100 IU/ML

## 2024-05-01 ENCOUNTER — OFFICE VISIT (OUTPATIENT)
Dept: GYNECOLOGY | Facility: CLINIC | Age: 44
End: 2024-05-01
Payer: MEDICAID

## 2024-05-01 VITALS
DIASTOLIC BLOOD PRESSURE: 82 MMHG | WEIGHT: 131 LBS | BODY MASS INDEX: 26.41 KG/M2 | SYSTOLIC BLOOD PRESSURE: 114 MMHG | HEIGHT: 59 IN | OXYGEN SATURATION: 99 % | HEART RATE: 76 BPM | TEMPERATURE: 98 F

## 2024-05-01 DIAGNOSIS — Z90.711 S/P PARTIAL HYSTERECTOMY WITH REMAINING CERVICAL STUMP: ICD-10-CM

## 2024-05-01 DIAGNOSIS — N89.8 VAGINAL LESION: ICD-10-CM

## 2024-05-01 DIAGNOSIS — A58 VAGINAL CUFF GRANULOMA: ICD-10-CM

## 2024-05-01 DIAGNOSIS — Z12.4 ENCOUNTER FOR PAP SMEAR OF CERVIX WITH HPV DNA COTESTING: Primary | ICD-10-CM

## 2024-05-01 PROCEDURE — 88174 CYTOPATH C/V AUTO IN FLUID: CPT

## 2024-05-01 PROCEDURE — 99214 OFFICE O/P EST MOD 30 MIN: CPT | Mod: PBBFAC

## 2024-05-01 PROCEDURE — 87624 HPV HI-RISK TYP POOLED RSLT: CPT

## 2024-05-01 NOTE — PROGRESS NOTES
"[facility] GYNECOLOGY CLINIC NOTE     Raegan Paez is a 43 y.o. No obstetric history on file. presenting to GYN clinic as new patient for lesion on vaginal cuff noted on 2023 CT.    Reports hysterectomy >10 years ago in [place] for unknown reasons.   Denies hx of abnormal pap smear.   Does not have GYN provider.     Patient denies abnormal vaginal bleeding, abnormal discharge, pelvic pain, abdominal pain, vulvar rash or skin changes, dysuria, dyspareunia.        Gynecology  OB History    No obstetric history on file.        Past Medical History:   Diagnosis Date    Depression     Hepatitis B     Insomnia       Past Surgical History:   Procedure Laterality Date     SECTION  Unsure    CHOLECYSTECTOMY  Unsure    HYSTERECTOMY      TUBAL LIGATION        Current Outpatient Medications   Medication Instructions    ARIPiprazole (ABILIFY) 5 mg, Oral    CLEARLAX 17 g, Oral    FLUoxetine 40 mg, Oral, Daily    hydrOXYzine pamoate (VISTARIL) 25 MG Cap TAKE 1 CAPSULE BY MOUTH TWICE DAILY AS NEEDED FOR ANXIETY    ketorolac (TORADOL) 10 mg, Oral, Every 6 hours PRN    metoprolol succinate (TOPROL-XL) 25-50 mg, Oral, Every morning    metroNIDAZOLE (METROGEL) 0.75 % (37.5mg/5 gram) vaginal gel 1 applicator, Vaginal, Nightly    traZODone (DESYREL) 50 mg, Oral, Nightly    triamcinolone acetonide 0.1% (KENALOG) 0.1 % cream Topical (Top), 2 times daily    venlafaxine (EFFEXOR) 25 mg, Oral, Daily    venlafaxine (EFFEXOR-XR) 37.5 mg, Oral, Daily    venlafaxine (EFFEXOR-XR) 75 mg, Oral, Daily     Social History     Tobacco Use    Smoking status: Never     Passive exposure: Never    Smokeless tobacco: Never   Substance Use Topics    Alcohol use: Never    Drug use: Never       Review of Systems  Pertinent items noted in HPI.    Objective:     Vitals:    24 0957   BP: 114/82   BP Location: Right arm   Pulse: 76   Temp: 98.3 °F (36.8 °C)   SpO2: 99%   Weight: 59.4 kg (131 lb)   Height: 4' 11" (1.499 m)     Body mass index " is 26.46 kg/m².    Physical Exam:   General: Alert and oriented, in no acute distress  Lungs: Breathing comfortably on room air, no conversational dyspnea  Heart: Regular rate, extremities well perfused  Abdomen: Soft, non-distended, non tender to palpation, no involuntary guarding, no rebound tenderness  Extremities: Atraumatic, non-edematous, no cords or calf tenderness, no significant calf/ankle edema  External genitalia: Normal female genitalia without lesion, discharge or tenderness. Normal appearing urethral meatus. Normal appearing external anus.  Bimanual Exam: No cervical motion tenderness.   Speculum Exam: Vaginal mucosa pink and moist, without lesion. Scant, white discharge present in vaginal canal. Cervix well visualized, smooth in contour with no masses or lesions. Os normal in appearance without blood or discharge.   Note:  Female nurse chaperone present for entirety of exam.    Relevant Labs:   Lab Results   Component Value Date    WBC 5.02 11/10/2023    HGB 13.8 11/10/2023    HCT 41.4 11/10/2023    MCV 92.4 11/10/2023     11/10/2023     Relevant Imaging:  CT Abdomen Pelvis with Contrast   Impression:     1. The uterus is not identified consistent with hysterectomy. The uterine stump appears bulky and shows low attenuation on series 2 image 108 such that any underlying lesion is not excluded. Correlate clinically as regards additional evaluation and follow-up.     2. There are multiple fluid filled small bowel loops which are mildly distended. There is mild fluid-filled distension of the cecum, ascending colon and transverse colon till the level of junction of splenic flexure and upper descending colon which shows focal circumferential wall thickening and mucosal enhancement. There is surrounding localized fat stranding in the left upper quadrant. These findings are suggestive pronounced enterocolitis. Correlate with clinical and laboratory findings as regards infectious versus inflammatory  etiology.     3. Details and other findings as discussed above.     I concur with the preliminary report above.        Electronically signed by:Kofi Harmon  Date:                                            07/03/2023  Time:                                           10:24      Assessment:       43 y.o. No obstetric history on file. here for vaginal lesion noted on CT.  1. Encounter for Pap smear of cervix with HPV DNA cotesting  Liquid-Based Pap Smear, Screening Screening      3. S/p partial hysterectomy with remaining cervical stump        4. Vaginal lesion               Plan:     - reported vaginal lesion on vaginal is retained cervical stump  - pap smear obtained today     Problem List Items Addressed This Visit    None  Visit Diagnoses       Encounter for Pap smear of cervix with HPV DNA cotesting    -  Primary    Relevant Orders    Liquid-Based Pap Smear, Screening Screening    Vaginal cuff granuloma        S/p partial hysterectomy with remaining cervical stump        Vaginal lesion              Return to clinic 1 year with     Discussed patient and plan with Dr Provost Angelique Diop [facility]  [facility]

## 2024-05-08 LAB
HIGH RISK HPV: NEGATIVE
PSYCHE PATHOLOGY RESULT: NORMAL

## 2024-05-29 ENCOUNTER — OFFICE VISIT (OUTPATIENT)
Dept: INFECTIOUS DISEASES | Facility: CLINIC | Age: 44
End: 2024-05-29
Payer: MEDICAID

## 2024-05-29 VITALS
WEIGHT: 129.88 LBS | HEART RATE: 80 BPM | BODY MASS INDEX: 26.18 KG/M2 | TEMPERATURE: 98 F | DIASTOLIC BLOOD PRESSURE: 73 MMHG | RESPIRATION RATE: 12 BRPM | HEIGHT: 59 IN | SYSTOLIC BLOOD PRESSURE: 105 MMHG

## 2024-05-29 DIAGNOSIS — B18.1 CHRONIC HEPATITIS B: Primary | ICD-10-CM

## 2024-05-29 PROCEDURE — 3008F BODY MASS INDEX DOCD: CPT | Mod: CPTII,,, | Performed by: NURSE PRACTITIONER

## 2024-05-29 PROCEDURE — 3074F SYST BP LT 130 MM HG: CPT | Mod: CPTII,,, | Performed by: NURSE PRACTITIONER

## 2024-05-29 PROCEDURE — 99213 OFFICE O/P EST LOW 20 MIN: CPT | Mod: S$PBB,,, | Performed by: NURSE PRACTITIONER

## 2024-05-29 PROCEDURE — 1160F RVW MEDS BY RX/DR IN RCRD: CPT | Mod: CPTII,,, | Performed by: NURSE PRACTITIONER

## 2024-05-29 PROCEDURE — 1159F MED LIST DOCD IN RCRD: CPT | Mod: CPTII,,, | Performed by: NURSE PRACTITIONER

## 2024-05-29 PROCEDURE — 3078F DIAST BP <80 MM HG: CPT | Mod: CPTII,,, | Performed by: NURSE PRACTITIONER

## 2024-05-29 PROCEDURE — 99214 OFFICE O/P EST MOD 30 MIN: CPT | Mod: PBBFAC | Performed by: NURSE PRACTITIONER

## 2024-05-29 RX ORDER — BUPROPION HYDROCHLORIDE 150 MG/1
150 TABLET ORAL
COMMUNITY
Start: 2024-05-28

## 2024-05-29 NOTE — PROGRESS NOTES
Patient ID: Raegan Paez 43 y.o.     Chief Complaint:   Chief Complaint   Patient presents with    Followup Hep B     Denies problems        HPI:    5/29/24  Raegan is a 44 yo WF here today for HBV f/u visit.  She remains treatment naive and is doing well overall. She denies jaundice, icterus, nausea, vomiting, dark urine, or santhosh colored stools.  Labs collected 11/23 HBV , AST 14, ALT 14, AFP 3.3, Hep A ab +. RUQ abd u/s 10/23 with mild steatosis. No indications to initiate HBV treatment at this juncture, will  continue to monitor. All questions answered & concerns addressed.    11/10/23  Raegan is a 44 yo WF presenting today for HBV f/u visit. She is treatment naive and denies jaundice, icterus, nausea, vomiting, dark urine, or santhosh colored stools.  Last labs 7/23 AST 12, ALT 8; 5/23 HBV .  RUQ abd u/s 10/4/23 mild steatosis.  FibroScan not available today, will schedule for repeat scan in 2-4 weeks. Denies any new sexual partners. She declines flu vaccine today. Doing well overall. All questions answered & concerns addressed.     5/3/23  Raegan is a 41 yo WF here today for HBV f/u visit.  Last labs 7/22 HBV .  She denies jaundice, icterus, nausea, vomiting, dark urine, or santhosh colored stools.  She is treatment naive.  Baseline FibroScan 9/22 S1, F0-1.  Will repeat next visit. RUQ abd u/s repeated 4/23, mild steatosis noted. She is due for 2nd dose of Hep A vaccine today, amenable to same.  She tells me that her sister also has Hep B, followed by AXEL Vega NP & is prescribed treatment.  Guidelines explained to patient, voiced understanding.  She tells me that she is not eager for treatment, was just curious.  Will update labs & notify her if recommendations have changed based on updated labs. Voiced understanding & appreciation.           Past Medical History:   Diagnosis Date    Depression     Hepatitis B     Insomnia         Past Surgical History:   Procedure Laterality Date      SECTION  Unsure    CHOLECYSTECTOMY  Unsure    HYSTERECTOMY      TUBAL LIGATION  2011        Social History     Socioeconomic History    Marital status:      Spouse name: Carter    Number of children: 1   Tobacco Use    Smoking status: Never     Passive exposure: Never    Smokeless tobacco: Never   Substance and Sexual Activity    Alcohol use: Never    Drug use: Never    Sexual activity: Not Currently     Partners: Male     Social Determinants of Health     Financial Resource Strain: Low Risk  (2023)    Overall Financial Resource Strain (CARDIA)     Difficulty of Paying Living Expenses: Not hard at all   Food Insecurity: No Food Insecurity (2023)    Hunger Vital Sign     Worried About Running Out of Food in the Last Year: Never true     Ran Out of Food in the Last Year: Never true   Transportation Needs: No Transportation Needs (2023)    PRAPARE - Transportation     Lack of Transportation (Medical): No     Lack of Transportation (Non-Medical): No   Physical Activity: Inactive (2023)    Exercise Vital Sign     Days of Exercise per Week: 0 days     Minutes of Exercise per Session: 0 min   Stress: No Stress Concern Present (2023)    [employer] - Occupational Stress Questionnaire     Feeling of Stress : Not at all   Housing Stability: Low Risk  (2023)    Housing Stability Vital Sign     Unable to Pay for Housing in the Last Year: No     Number of Places Lived in the Last Year: 1     Unstable Housing in the Last Year: No        Family History   Problem Relation Name Age of Onset    Hypertension Mother      Diabetes Father Lm feng     Hepatitis Sister rolan     Scoliosis Sister michelle         Review of patient's allergies indicates:   Allergen Reactions    Metronidazole Shortness Of Breath    Lortab [hydrocodone-acetaminophen]      Unsure    Tylenol [acetaminophen]      Unsure        Immunization History   Administered Date(s) Administered     "Hepatitis A, Adult 11/01/2022, 05/03/2023        Review of Systems   Constitutional: Negative.    HENT: Negative.     Eyes: Negative.    Respiratory: Negative.     Cardiovascular: Negative.    Gastrointestinal: Negative.    Genitourinary: Negative.    Musculoskeletal: Negative.    Skin: Negative.    Neurological: Negative.    Endo/Heme/Allergies: Negative.    Psychiatric/Behavioral: Negative.     All other systems reviewed and are negative.         Objective:      /73 (BP Location: Left arm, Patient Position: Sitting, BP Method: Medium (Automatic))   Pulse 80   Temp 98.2 °F (36.8 °C) (Oral)   Resp 12   Ht 4' 11" (1.499 m)   Wt 58.9 kg (129 lb 13.6 oz)   BMI 26.23 kg/m²      Physical Exam  Vitals reviewed.   Constitutional:       General: She is not in acute distress.     Appearance: Normal appearance. She is not toxic-appearing.   Eyes:      General: No scleral icterus.  Cardiovascular:      Rate and Rhythm: Normal rate and regular rhythm.      Heart sounds: Normal heart sounds.   Pulmonary:      Effort: Pulmonary effort is normal. No respiratory distress.      Breath sounds: Normal breath sounds.   Abdominal:      General: Bowel sounds are normal. There is no distension.      Palpations: Abdomen is soft. There is no mass.      Tenderness: There is no abdominal tenderness.   Musculoskeletal:         General: Normal range of motion.   Skin:     General: Skin is warm and dry.   Neurological:      Mental Status: She is alert and oriented to person, place, and time.          Labs:   Lab Results   Component Value Date    WBC 5.02 11/10/2023    HGB 13.8 11/10/2023    HCT 41.4 11/10/2023    MCV 92.4 11/10/2023     11/10/2023       CMP  Sodium   Date Value Ref Range Status   11/10/2023 139 136 - 145 mmol/L Final     Potassium   Date Value Ref Range Status   11/10/2023 3.6 3.5 - 5.1 mmol/L Final     CO2   Date Value Ref Range Status   11/10/2023 22 22 - 29 mmol/L Final     Blood Urea Nitrogen   Date Value " Ref Range Status   11/10/2023 13.3 7.0 - 18.7 mg/dL Final     Creatinine   Date Value Ref Range Status   11/10/2023 0.78 0.55 - 1.02 mg/dL Final     Calcium   Date Value Ref Range Status   11/10/2023 9.3 8.4 - 10.2 mg/dL Final     Albumin   Date Value Ref Range Status   11/10/2023 3.8 3.5 - 5.0 g/dL Final     Bilirubin Total   Date Value Ref Range Status   11/10/2023 0.4 <=1.5 mg/dL Final     ALP   Date Value Ref Range Status   11/10/2023 81 40 - 150 unit/L Final     AST   Date Value Ref Range Status   11/10/2023 14 5 - 34 unit/L Final     ALT   Date Value Ref Range Status   11/10/2023 14 0 - 55 unit/L Final     eGFR   Date Value Ref Range Status   11/10/2023 >60 mls/min/1.73/m2 Final     Lab Results   Component Value Date    TSH 3.4065 07/05/2022     HIV   Date Value Ref Range Status   11/10/2023 Nonreactive Nonreactive Final     PT   Date Value Ref Range Status   11/10/2023 12.2 11.4 - 14.0 seconds Final     INR   Date Value Ref Range Status   11/10/2023 0.9 <=1.3 Final     Syphilis Antibody   Date Value Ref Range Status   11/10/2023 Nonreactive Nonreactive, Equivocal Final     Hep BsAg Interp   Date Value Ref Range Status   11/10/2023 Reactive (A) Nonreactive Final     Hep BsAb Interp   Date Value Ref Range Status   09/16/2022 Nonreactive Nonreactive Final     Hep BsAb   Date Value Ref Range Status   09/16/2022 0.16 mIU/mL Final     Comment:     Interpretive Data Hep Bs Ab#  Result (mIU/mL)Interpretation - Hep B Surface Antibody  <8.00Nonreactive: Patient considered not immune to HBV infection  >=8.00 to <12.00Grayzone: Unable to determine immune status. Follow-up testing should be performed  >=12.00Reactive: Patient considered immune to HBV infection       Hep C Ab Interp   Date Value Ref Range Status   11/10/2023 Nonreactive Nonreactive Final     Hep BsAg Conf   Date Value Ref Range Status   11/10/2023 Confirmed Positive  Final     Results for orders placed or performed in visit on 09/16/22   Hepatitis B e  Antigen   Result Value Ref Range    Hepatitis Be Ag, S Negative Negative     Results for orders placed or performed in visit on 09/16/22   Hepatitis B e antibody   Result Value Ref Range    HBe Antibody Positive (A) Negative     Results for orders placed or performed in visit on 11/10/23   Hepatitis B DNA, Quant   Result Value Ref Range    HBV DNA Detect/Quant 100 (A) Undetected IU/mL       Imaging: Reviewed most recent relevant imaging studies available, notable results highlighted in this note      Medications:     Current Outpatient Medications   Medication Instructions    buPROPion (WELLBUTRIN XL) 150 mg, Oral    CLEARLAX 17 g, Oral    FLUoxetine 40 mg, Oral, Daily    hydrOXYzine pamoate (VISTARIL) 25 MG Cap TAKE 1 CAPSULE BY MOUTH TWICE DAILY AS NEEDED FOR ANXIETY    metoprolol succinate (TOPROL-XL) 25-50 mg, Oral, Every morning    metroNIDAZOLE (METROGEL) 0.75 % (37.5mg/5 gram) vaginal gel 1 applicator, Vaginal, Nightly    traZODone (DESYREL) 50 mg, Oral, Nightly    triamcinolone acetonide 0.1% (KENALOG) 0.1 % cream Topical (Top), 2 times daily       Assessment:       Problem List Items Addressed This Visit    None  Visit Diagnoses       Chronic hepatitis B    -  Primary    Relevant Orders    AFP Tumor Marker    CBC Auto Differential    Comprehensive Metabolic Panel    Protime-INR    CHAPA Fibrosure    HEPATITIS B VIRAL DNA, QUANTITATIVE    US Abdomen Limited               Plan:      Chronic hepatitis B  -     AFP Tumor Marker; Future; Expected date: 05/29/2024  -     CBC Auto Differential; Future; Expected date: 05/29/2024  -     Comprehensive Metabolic Panel; Future; Expected date: 05/29/2024  -     Protime-INR; Future; Expected date: 05/29/2024  -     CHAPA Fibrosure; Future; Expected date: 05/29/2024  -     HEPATITIS B VIRAL DNA, QUANTITATIVE; Future; Expected date: 05/29/2024  -     US Abdomen Limited; Future; Expected date: 06/12/2024  Chronic inactive.   Hep B s ag +, Hep B e ag -, Hep B e ab +, Hep B c  ab +, Hep B s ab -.  HBV  (5/23), 100 (11/23)  ALT 8 (7/23), 14 (11/23)  RUQ abd u/s 10/4/23, mild steatosis  Fibroscan 9/22 S1, F0-1.  Blood precautions, do not share a razor, needle, toothbrush, or clippers with anyone.    Use condoms for sexual encounters.   Vaccination of all household members and close contacts strongly encouraged.  Avoid or minimize all alcohol intake.   Limit Tylenol use to 2 grams per day.   RUQ abdominal ultrasound every 6-12 months for liver cancer screening.   Lower fat diet, higher fiber diet.   Repeat labs today.  Repeat FibroScan within 2-4 weeks.  RTC 6 months with Amanda.

## 2024-06-29 ENCOUNTER — HOSPITAL ENCOUNTER (EMERGENCY)
Facility: HOSPITAL | Age: 44
Discharge: HOME | End: 2024-06-29
Attending: EMERGENCY MEDICINE
Payer: MEDICAID

## 2024-06-29 VITALS
OXYGEN SATURATION: 97 % | RESPIRATION RATE: 20 BRPM | HEART RATE: 66 BPM | BODY MASS INDEX: 25.78 KG/M2 | DIASTOLIC BLOOD PRESSURE: 75 MMHG | WEIGHT: 127.88 LBS | HEIGHT: 59 IN | SYSTOLIC BLOOD PRESSURE: 107 MMHG | TEMPERATURE: 99 F

## 2024-06-29 DIAGNOSIS — M54.50 ACUTE BILATERAL LOW BACK PAIN WITHOUT SCIATICA: Primary | ICD-10-CM

## 2024-06-29 PROCEDURE — 99284 EMERGENCY DEPT VISIT MOD MDM: CPT | Mod: 25

## 2024-06-29 PROCEDURE — 25000003 PHARM REV CODE 250: Performed by: NURSE PRACTITIONER

## 2024-06-29 RX ORDER — METHOCARBAMOL 500 MG/1
1000 TABLET, FILM COATED ORAL
Status: COMPLETED | OUTPATIENT
Start: 2024-06-29 | End: 2024-06-29

## 2024-06-29 RX ORDER — IBUPROFEN 400 MG/1
400 TABLET ORAL
Status: COMPLETED | OUTPATIENT
Start: 2024-06-29 | End: 2024-06-29

## 2024-06-29 RX ORDER — DICLOFENAC SODIUM 75 MG/1
75 TABLET, DELAYED RELEASE ORAL 2 TIMES DAILY PRN
Qty: 20 TABLET | Refills: 0 | Status: SHIPPED | OUTPATIENT
Start: 2024-06-29 | End: 2024-07-05 | Stop reason: SDUPTHER

## 2024-06-29 RX ORDER — TIZANIDINE 4 MG/1
8 TABLET ORAL EVERY 6 HOURS PRN
Qty: 30 TABLET | Refills: 0 | Status: SHIPPED | OUTPATIENT
Start: 2024-06-29 | End: 2024-07-05 | Stop reason: SDUPTHER

## 2024-06-29 RX ADMIN — IBUPROFEN 400 MG: 400 TABLET, FILM COATED ORAL at 04:06

## 2024-06-29 RX ADMIN — METHOCARBAMOL 1000 MG: 500 TABLET ORAL at 04:06

## 2024-06-29 NOTE — ED PROVIDER NOTES
Encounter Date: 2024       History     Chief Complaint   Patient presents with    Back Pain     C/o back pain. States pain due to scoliosis. Feels like a pinching sensation mid to lower part of back.      The patient presents with low back pain.  The onset was yesterday.  The course/duration of symptoms is constant.  Type of injury: felt pinch in lower back while reaching to put on deodorant, denies falls.  Location: lumbar. Radiating pain: none. The character of symptoms is dull.  The degree at onset was moderate.  The degree at present is moderate.  There are exacerbating factors including movement and changing position.  The relieving factor is rest.  Risk factors consist of history of scoliosis.  Prior episodes: occasional.  Therapy today: none.  Associated symptoms: none, denies bowel dysfunction, denies bladder dysfunction, denies altered sensation, denies focal weakness, denies saddle numbness, denies abdominal pain and denies dysuria.        Review of patient's allergies indicates:   Allergen Reactions    Metronidazole Shortness Of Breath    Lortab [hydrocodone-acetaminophen]      Unsure    Tylenol [acetaminophen]      Unsure     Past Medical History:   Diagnosis Date    Depression     Hepatitis B     Insomnia      Past Surgical History:   Procedure Laterality Date     SECTION  Unsure    CHOLECYSTECTOMY  Unsure    HYSTERECTOMY      TUBAL LIGATION       Family History   Problem Relation Name Age of Onset    Hypertension Mother      Diabetes Father Lm feng     Hepatitis Sister rolan     Scoliosis Sister michelle      Social History     Tobacco Use    Smoking status: Never     Passive exposure: Never    Smokeless tobacco: Never   Substance Use Topics    Alcohol use: Never    Drug use: Never     Review of Systems   Constitutional:  Negative for fever.   HENT:  Negative for sore throat.    Respiratory:  Negative for shortness of breath.    Cardiovascular:  Negative for chest pain.    Gastrointestinal:  Negative for nausea.   Genitourinary:  Negative for dysuria.   Musculoskeletal:  Positive for back pain.   Skin:  Negative for rash.   Neurological:  Negative for weakness.   Hematological:  Does not bruise/bleed easily.   All other systems reviewed and are negative.      Physical Exam     Initial Vitals [06/29/24 1542]   BP Pulse Resp Temp SpO2   107/75 66 20 99 °F (37.2 °C) 97 %      MAP       --         Physical Exam    Nursing note and vitals reviewed.  Constitutional: She appears well-developed and well-nourished.   HENT:   Head: Normocephalic and atraumatic.   Neck: Neck supple.   Normal range of motion.  Cardiovascular:  Normal rate, regular rhythm, normal heart sounds and intact distal pulses.           Pulmonary/Chest: Effort normal and breath sounds normal.   Abdominal: Abdomen is soft and flat. Bowel sounds are normal. There is no abdominal tenderness.   Musculoskeletal:         General: Normal range of motion.      Cervical back: Normal range of motion and neck supple.      Comments: No costovertebral angle tenderness, , Thoracic: no vertebral point tenderness, Lumbar: lateral mild tenderness, midline negative, no vertebral point tenderness, Sacral: no vertebral point tenderness, Testing: Straight leg raising, supine negative.  No C/T/L point tenderness. normal reflexes.        Neurological: She is alert. She has normal strength.   Skin: Skin is warm and dry.   Psychiatric: She has a normal mood and affect.         ED Course   Procedures  Labs Reviewed - No data to display       Imaging Results              X-Ray Lumbar Spine 2 Or 3 Views (Final result)  Result time 06/29/24 16:17:21      Final result by Pan Ramirez MD (06/29/24 16:17:21)                   Impression:      No acute osseous findings.  Degenerative changes as described in the body of the report.      Electronically signed by: Pan Ramirez  Date:    06/29/2024  Time:    16:17               Narrative:     EXAMINATION:  XR LUMBAR SPINE 2 OR 3 VIEWS    CLINICAL HISTORY:  low back pain;    TECHNIQUE:  AP and lateral radiographic views of the lumbar spine were obtained.    COMPARISON:  None    FINDINGS:  There is slight dextrocurvature of the lumbar spine with relatively preserved lumbar lordosis.  The vertebral body heights are well maintained with no evidence of acute fracture.  There is multilevel spondylosis of the lumbar spine including mild marginal endplate osteophytes and mild lumbar facet arthropathy, most significant at L4-5 and L5-S1.  There is transitional anatomy with hypoplastic ribs on L1.  There is no evidence of listhesis.  There is mild L5-S1 disc height loss.  The remaining intervertebral discs levels appear well maintained.  The paraspinal soft tissues are unremarkable.                                       Medications   methocarbamoL tablet 1,000 mg (1,000 mg Oral Given 6/29/24 1620)   ibuprofen tablet 400 mg (400 mg Oral Given 6/29/24 1620)     Medical Decision Making  The patient presents with low back pain.  The onset was yesterday.  The course/duration of symptoms is constant.  Type of injury: felt pinch in lower back while reaching to put on deodorant, denies falls.  Location: lumbar. Radiating pain: none. The character of symptoms is dull.  The degree at onset was moderate.  The degree at present is moderate.  There are exacerbating factors including movement and changing position.  The relieving factor is rest.  Risk factors consist of history of scoliosis.  Prior episodes: occasional.  Therapy today: none.  Associated symptoms: none, denies bowel dysfunction, denies bladder dysfunction, denies altered sensation, denies focal weakness, denies saddle numbness, denies abdominal pain and denies dysuria.      5:01 PM DISPOSITION: The patient is resting comfortably in no acute distress.  She is hemodynamically stable and is without objective evidence for acute process requiring urgent intervention  or hospitalization. I provided counseling to patient with regard to condition, the treatment plan, specific conditions for return, and the importance of follow up. Detailed written and verbal instructions provided to patient and she expressed a verbal understanding of the discharge instructions and overall management plan. Reiterated the importance of medication administration and safety and advised patient to follow up with primary care provider in 3-5 days or sooner if needed.  Answered questions at this time. The patient is stable for discharge.       Amount and/or Complexity of Data Reviewed  Radiology: ordered. Decision-making details documented in ED Course.    Risk  Prescription drug management.      Additional MDM:   Differential Diagnosis:   At this time differential diagnosis is but not limited to fracture, sprain, sciatica               ED Course as of 06/29/24 1701   Sat Jun 29, 2024   1637 X-Ray Lumbar Spine 2 Or 3 Views  Impression:     No acute osseous findings.  Degenerative changes as described in the body of the report.   [RB]   1659 Given strict ED return precautions. I have spoken with the patient and/or caregivers. I have explained the patient's condition, diagnoses and treatment plan based on the information available to me at this time. I have answered the patient's and/or caregiver's questions and addressed any concerns. The patient and/or caregivers have as good an understanding of the patient's diagnosis, condition and treatment plan as can be expected at this point. The vital signs have been stable. The patient's condition is stable and appropriate for discharge from the emergency department.      The patient will pursue further outpatient evaluation with the primary care physician or other designated or consulting physician as outlined in the discharge instructions. The patient and/or caregivers are agreeable to this plan of care and follow-up instructions have been explained in detail.  The patient and/or caregivers have received these instructions in written format and have expressed an understanding of the discharge instructions. The patient and/or caregivers are aware that any significant change in condition or worsening of symptoms should prompt an immediate return to this or the closest emergency department or a call to 911.      [RB]      ED Course User Index  [RB] René Benson ACNP                           Clinical Impression:  Final diagnoses:  [M54.50] Acute bilateral low back pain without sciatica (Primary)          ED Disposition Condition    Discharge Stable          ED Prescriptions       Medication Sig Dispense Start Date End Date Auth. Provider    diclofenac (VOLTAREN) 75 MG EC tablet Take 1 tablet (75 mg total) by mouth 2 (two) times daily as needed (pain). 20 tablet 6/29/2024 -- René Benson ACNP    tiZANidine (ZANAFLEX) 4 MG tablet Take 2 tablets (8 mg total) by mouth every 6 (six) hours as needed (pain or spasms). May cause drowsiness 30 tablet 6/29/2024 7/9/2024 René Benson ACNP          Follow-up Information       Follow up With Specialties Details Why Contact Info    Flex Macedo FNP Family Medicine In 3 days  [address] [facility] [address]  [phone]      [facility] Emergency Medicine  If symptoms worsen [address] [facility] [phone]             René Benson ACNP  06/29/24 [id number]

## 2024-07-05 ENCOUNTER — OFFICE VISIT (OUTPATIENT)
Dept: INTERNAL MEDICINE | Facility: CLINIC | Age: 44
End: 2024-07-05
Payer: MEDICAID

## 2024-07-05 VITALS
WEIGHT: 130.19 LBS | RESPIRATION RATE: 18 BRPM | HEART RATE: 71 BPM | BODY MASS INDEX: 26.24 KG/M2 | DIASTOLIC BLOOD PRESSURE: 71 MMHG | HEIGHT: 59 IN | SYSTOLIC BLOOD PRESSURE: 103 MMHG | TEMPERATURE: 99 F

## 2024-07-05 DIAGNOSIS — M54.16 LUMBAR RADICULOPATHY: Primary | ICD-10-CM

## 2024-07-05 PROCEDURE — 99215 OFFICE O/P EST HI 40 MIN: CPT | Mod: PBBFAC | Performed by: NURSE PRACTITIONER

## 2024-07-05 RX ORDER — DICLOFENAC SODIUM 75 MG/1
75 TABLET, DELAYED RELEASE ORAL 2 TIMES DAILY PRN
Qty: 30 TABLET | Refills: 1 | Status: SHIPPED | OUTPATIENT
Start: 2024-07-05

## 2024-07-05 RX ORDER — TIZANIDINE 4 MG/1
8 TABLET ORAL 3 TIMES DAILY PRN
Qty: 30 TABLET | Refills: 0 | Status: SHIPPED | OUTPATIENT
Start: 2024-07-05 | End: 2024-07-15

## 2024-07-05 NOTE — PROGRESS NOTES
[facility]  Omar Otero DNP     Patient ID: [id number]     Chief Complaint: Back Pain (States scoliosis, can't bend down without pain, refused vaccines)      HPI:     Raegan Paez is a 43 y.o. female here today for follow-up for ongoing low back pain.  Reports history of scoliosis, no known history of back surgeries or repair.    Health Maintenance         Date Due Completion Date    TETANUS VACCINE 2025 (Originally 2000) ---    Pneumococcal Vaccines (Age 0-64) (1 of 2 - PCV) 2025 (Originally 1988) ---    Influenza Vaccine (1) 2024 ---    Mammogram 10/06/2024 10/6/2023    Hemoglobin A1c (Diabetic Prevention Screening) 2025            Past Medical History:   Diagnosis Date    Depression     Hepatitis B     Insomnia         Past Surgical History:   Procedure Laterality Date     SECTION  Unsure    CHOLECYSTECTOMY  Unsure    HYSTERECTOMY      TUBAL LIGATION          Social History     Tobacco Use    Smoking status: Never     Passive exposure: Never    Smokeless tobacco: Never   Substance and Sexual Activity    Alcohol use: Never    Drug use: Never    Sexual activity: Not Currently     Partners: Male        Current Outpatient Medications   Medication Instructions    buPROPion (WELLBUTRIN XL) 150 mg, Oral    CLEARLAX 17 g, Oral    diclofenac (VOLTAREN) 75 mg, Oral, 2 times daily PRN    FLUoxetine 40 mg, Oral, Daily    hydrOXYzine pamoate (VISTARIL) 25 MG Cap TAKE 1 CAPSULE BY MOUTH TWICE DAILY AS NEEDED FOR ANXIETY    metoprolol succinate (TOPROL-XL) 25-50 mg, Oral, Every morning    metroNIDAZOLE (METROGEL) 0.75 % (37.5mg/5 gram) vaginal gel 1 applicator, Vaginal, Nightly    tiZANidine (ZANAFLEX) 8 mg, Oral, 3 times daily PRN, May cause drowsiness    traZODone (DESYREL) 50 mg, Oral, Nightly    triamcinolone acetonide 0.1% (KENALOG) 0.1 % cream Topical (Top), 2 times daily       Review of patient's allergies indicates:   Allergen Reactions  "   Metronidazole Shortness Of Breath    Lortab [hydrocodone-acetaminophen]      Unsure    Tylenol [acetaminophen]      Unsure        Patient Care Team:  Omar Otero FNP as PCP - General (Family Medicine)  Amanda Barrow APRN as Nurse Practitioner (Infectious Diseases)     Subjective:     Review of Systems   Constitutional:  Negative for appetite change, chills, diaphoresis and fever.   HENT:  Negative for ear pain, sinus pain and sore throat.    Eyes:  Negative for pain and visual disturbance.   Respiratory:  Negative for cough, shortness of breath and wheezing.    Cardiovascular:  Negative for chest pain, palpitations and leg swelling.   Gastrointestinal:  Negative for abdominal pain, blood in stool, diarrhea, nausea and vomiting.   Endocrine: Negative for cold intolerance.   Genitourinary:  Negative for difficulty urinating, dysuria, frequency and hematuria.   Musculoskeletal:  Positive for back pain. Negative for arthralgias, joint swelling and myalgias.   Skin:  Negative for color change and rash.   Allergic/Immunologic: Negative.    Neurological: Negative.  Negative for dizziness, syncope, light-headedness and numbness.   Hematological: Negative.    Psychiatric/Behavioral: Negative.  Negative for dysphoric mood and suicidal ideas. The patient is not nervous/anxious.    All other systems reviewed and are negative.      12 point review of systems conducted, negative except as stated in the history of present illness. See HPI for details.    Objective:     Visit Vitals  /71 (BP Location: Right arm, Patient Position: Sitting, BP Method: Medium (Automatic))   Pulse 71   Temp 98.6 °F (37 °C) (Oral)   Resp 18   Ht 4' 11.02" (1.499 m)   Wt 59.1 kg (130 lb 3.2 oz)   BMI 26.28 kg/m²       Physical Exam  Vitals and nursing note reviewed.   Constitutional:       General: She is not in acute distress.     Appearance: She is not ill-appearing.   HENT:      Head: Normocephalic and atraumatic.      " Mouth/Throat:      Mouth: Mucous membranes are moist.      Pharynx: Oropharynx is clear.   Eyes:      General: No scleral icterus.     Extraocular Movements: Extraocular movements intact.      Conjunctiva/sclera: Conjunctivae normal.      Pupils: Pupils are equal, round, and reactive to light.   Neck:      Vascular: No carotid bruit.   Cardiovascular:      Rate and Rhythm: Normal rate and regular rhythm.      Heart sounds: No murmur heard.     No friction rub. No gallop.   Pulmonary:      Effort: Pulmonary effort is normal. No respiratory distress.      Breath sounds: Normal breath sounds. No wheezing, rhonchi or rales.   Abdominal:      General: Abdomen is flat. Bowel sounds are normal. There is no distension.      Palpations: Abdomen is soft. There is no mass.      Tenderness: There is no abdominal tenderness.   Musculoskeletal:      Cervical back: Normal, normal range of motion and neck supple.      Thoracic back: Normal.      Lumbar back: Tenderness present. No swelling or deformity. Decreased range of motion. Negative right straight leg raise test and negative left straight leg raise test.      Comments: Musculoskeletal testing is 5/5 upper and lower extremities bilaterally.  DTRs 2+ and full upper and lower bilaterally.  Negative straight leg raise bilaterally.  Patient has tenderness with palpating lumbar sacroiliac region and decreased range of motion with bending.   Skin:     General: Skin is warm and dry.   Neurological:      General: No focal deficit present.      Mental Status: She is alert.   Psychiatric:         Mood and Affect: Mood normal.         Labs Reviewed:     Chemistry:  Lab Results   Component Value Date     11/10/2023    K 3.6 11/10/2023    BUN 13.3 11/10/2023    CREATININE 0.78 11/10/2023    EGFRNORACEVR >60 11/10/2023    GLUCOSE 104 (H) 11/10/2023    CALCIUM 9.3 11/10/2023    ALKPHOS 81 11/10/2023    LABPROT 7.6 11/10/2023    LABPROT 12.2 11/10/2023    ALBUMIN 3.8 11/10/2023    AST  14 11/10/2023    ALT 14 11/10/2023    TSH 3.4065 07/05/2022        Lab Results   Component Value Date    HGBA1C 4.9 07/05/2022        Hematology:  Lab Results   Component Value Date    WBC 5.02 11/10/2023    HGB 13.8 11/10/2023    HCT 41.4 11/10/2023     11/10/2023       Lipid Panel:  Lab Results   Component Value Date    CHOL 189 07/05/2022    HDL 52 07/05/2022    .00 07/05/2022    TRIG 120 07/05/2022    TOTALCHOLEST 4 07/05/2022        Urine:  Lab Results   Component Value Date    APPEARANCEUA Clear 07/07/2023    SGUA 1.013 07/07/2023    PROTEINUA Negative 07/07/2023    KETONESUA Negative 07/07/2023    LEUKOCYTESUR 250 (A) 07/07/2023    RBCUA 0-5 07/07/2023    WBCUA 11-20 (A) 07/07/2023    BACTERIA Trace (A) 07/07/2023    SQEPUA Occ (A) 07/07/2023    HYALINECASTS None Seen 07/07/2023        Assessment:       ICD-10-CM ICD-9-CM   1. Lumbar radiculopathy  M54.16 724.4        Plan:     1. Lumbar radiculopathy  Assessment & Plan:  Lumbar Xrays 6/29/24  EXAMINATION:  XR LUMBAR SPINE 2 OR 3 VIEWS     CLINICAL HISTORY:  low back pain;     TECHNIQUE:  AP and lateral radiographic views of the lumbar spine were obtained.     COMPARISON:  None     FINDINGS:  There is slight dextrocurvature of the lumbar spine with relatively preserved lumbar lordosis.  The vertebral body heights are well maintained with no evidence of acute fracture.  There is multilevel spondylosis of the lumbar spine including mild marginal endplate osteophytes and mild lumbar facet arthropathy, most significant at L4-5 and L5-S1.  There is transitional anatomy with hypoplastic ribs on L1.  There is no evidence of listhesis.  There is mild L5-S1 disc height loss.  The remaining intervertebral discs levels appear well maintained.  The paraspinal soft tissues are unremarkable.     Impression:     No acute osseous findings.  Degenerative changes as described in the body of the report.    Her pain is low spine, sacroiliac region with  radiculopathy symptoms with bending.  She denies any bowel/bladder dysfunction.  He denies any history of childhood scoliosis but reports scoliosis was found with recent x-ray as above.  No noticeable curvature when assessing her back and shoulders today with standing or bending.  No history of spine surgery for scoliosis repair noted.      She was referred to PT by her previous PCP but a she did not hear any appointment scheduling.  No known injuries.  Pain has been worsening over the past few months.  Low back pain has been affecting her ADLs inability to perform her regular household duties.    She does get relief with tizanidine and diclofenac.  Also relief with lying down.  I recommend we move forward with MRI imaging to further assess.  We discussed conservative management including physical therapy, referral to pain management for potential injection, continue tizanidine and diclofenac for now.  Once we have MRI results referral to Neurosurgery if appropriate for consultation.  ER precautions are advised.       Orders:  -     tiZANidine (ZANAFLEX) 4 MG tablet; Take 2 tablets (8 mg total) by mouth 3 (three) times daily as needed (pain or spasms). May cause drowsiness  Dispense: 30 tablet; Refill: 0  -     diclofenac (VOLTAREN) 75 MG EC tablet; Take 1 tablet (75 mg total) by mouth 2 (two) times daily as needed (pain).  Dispense: 30 tablet; Refill: 1  -     MRI Lumbar Spine Without Contrast; Future; Expected date: 07/12/2024         Follow up in about 1 month (around 8/5/2024). In addition to their scheduled follow up, the patient has also been instructed to follow up on as needed basis.     Future Appointments   Date Time Provider Department Center   8/5/2024  9:00 AM Omar Otero FNP [facility]    12/2/2024  8:50 AM Amanda Barrow APRN [facility]    5/2/2025  9:30 AM Magali Cloud FNP [facility]        [facility]

## 2024-07-05 NOTE — ASSESSMENT & PLAN NOTE
Lumbar Xrays 6/29/24  EXAMINATION:  XR LUMBAR SPINE 2 OR 3 VIEWS     CLINICAL HISTORY:  low back pain;     TECHNIQUE:  AP and lateral radiographic views of the lumbar spine were obtained.     COMPARISON:  None     FINDINGS:  There is slight dextrocurvature of the lumbar spine with relatively preserved lumbar lordosis.  The vertebral body heights are well maintained with no evidence of acute fracture.  There is multilevel spondylosis of the lumbar spine including mild marginal endplate osteophytes and mild lumbar facet arthropathy, most significant at L4-5 and L5-S1.  There is transitional anatomy with hypoplastic ribs on L1.  There is no evidence of listhesis.  There is mild L5-S1 disc height loss.  The remaining intervertebral discs levels appear well maintained.  The paraspinal soft tissues are unremarkable.     Impression:     No acute osseous findings.  Degenerative changes as described in the body of the report.    Her pain is low spine, sacroiliac region with radiculopathy symptoms with bending.  She denies any bowel/bladder dysfunction.  He denies any history of childhood scoliosis but reports scoliosis was found with recent x-ray as above.  No noticeable curvature when assessing her back and shoulders today with standing or bending.  No history of spine surgery for scoliosis repair noted.      She was referred to PT by her previous PCP but a she did not hear any appointment scheduling.  No known injuries.  Pain has been worsening over the past few months.  Low back pain has been affecting her ADLs inability to perform her regular household duties.    She does get relief with tizanidine and diclofenac.  Also relief with lying down.  I recommend we move forward with MRI imaging to further assess.  We discussed conservative management including physical therapy, referral to pain management for potential injection, continue tizanidine and diclofenac for now.  Once we have MRI results referral to Neurosurgery if  appropriate for consultation.  ER precautions are advised.